# Patient Record
Sex: MALE | Race: WHITE | Employment: FULL TIME | URBAN - METROPOLITAN AREA
[De-identification: names, ages, dates, MRNs, and addresses within clinical notes are randomized per-mention and may not be internally consistent; named-entity substitution may affect disease eponyms.]

---

## 2017-04-19 ENCOUNTER — OFFICE VISIT (OUTPATIENT)
Dept: FAMILY MEDICINE CLINIC | Age: 52
End: 2017-04-19

## 2017-04-19 VITALS
RESPIRATION RATE: 18 BRPM | TEMPERATURE: 97.7 F | WEIGHT: 189 LBS | HEIGHT: 67 IN | HEART RATE: 60 BPM | DIASTOLIC BLOOD PRESSURE: 82 MMHG | BODY MASS INDEX: 29.66 KG/M2 | SYSTOLIC BLOOD PRESSURE: 118 MMHG

## 2017-04-19 DIAGNOSIS — K40.90 LEFT INGUINAL HERNIA: Primary | ICD-10-CM

## 2017-04-19 DIAGNOSIS — Z00.00 ROUTINE GENERAL MEDICAL EXAMINATION AT A HEALTH CARE FACILITY: ICD-10-CM

## 2017-04-19 DIAGNOSIS — B18.2 HEP C W/O COMA, CHRONIC (HCC): ICD-10-CM

## 2017-04-19 DIAGNOSIS — Z12.5 SCREENING PSA (PROSTATE SPECIFIC ANTIGEN): ICD-10-CM

## 2017-04-19 DIAGNOSIS — F41.9 ANXIETY: ICD-10-CM

## 2017-04-19 DIAGNOSIS — M54.5 LOW BACK PAIN, UNSPECIFIED BACK PAIN LATERALITY, UNSPECIFIED CHRONICITY, WITH SCIATICA PRESENCE UNSPECIFIED: ICD-10-CM

## 2017-04-19 DIAGNOSIS — D17.9 LIPOMA, UNSPECIFIED SITE: ICD-10-CM

## 2017-04-19 PROCEDURE — 99204 OFFICE O/P NEW MOD 45 MIN: CPT | Performed by: FAMILY MEDICINE

## 2017-04-19 RX ORDER — ESCITALOPRAM OXALATE 10 MG/1
10 TABLET ORAL DAILY
Qty: 30 TABLET | Refills: 3 | Status: SHIPPED | OUTPATIENT
Start: 2017-04-19 | End: 2017-06-07 | Stop reason: ALTCHOICE

## 2017-04-19 ASSESSMENT — ENCOUNTER SYMPTOMS
BACK PAIN: 1
SORE THROAT: 0
NAUSEA: 0
VOMITING: 0
RHINORRHEA: 0
SHORTNESS OF BREATH: 0
ABDOMINAL PAIN: 0
WHEEZING: 0
CONSTIPATION: 0
COUGH: 0
DIARRHEA: 0

## 2017-05-24 LAB
A/G RATIO: 1.5 (ref 1.5–2.5)
ABSOLUTE BASO #: 100 /CMM (ref 0–200)
ABSOLUTE EOS #: 100 /CMM (ref 0–500)
ABSOLUTE LYMPH #: 1300 /CMM (ref 1000–4800)
ABSOLUTE MONO #: 600 /CMM (ref 0–800)
ABSOLUTE NEUT #: 8700 /CMM (ref 1800–7700)
ALBUMIN SERPL-MCNC: 4.8 GM/DL (ref 3.5–5)
ALP BLD-CCNC: 74 IU/L (ref 41–137)
ALT SERPL-CCNC: 43 IU/L (ref 10–40)
ANION GAP SERPL CALCULATED.3IONS-SCNC: 6 MMOL/L (ref 4–12)
AST SERPL-CCNC: 39 IU/L (ref 15–41)
BASOPHILS RELATIVE PERCENT: 0.6 % (ref 0–2)
BILIRUB SERPL-MCNC: 1.2 MG/DL (ref 0.2–1)
BUN BLDV-MCNC: 12 MG/DL (ref 7–20)
CALCIUM SERPL-MCNC: 9.7 MG/DL (ref 8.8–10.5)
CHLORIDE BLD-SCNC: 103 MEQ/L (ref 101–111)
CHOLESTEROL/HDL RELATIVE RISK: 4.4 (ref 4–5)
CHOLESTEROL: 188 MG/DL
CO2: 28 MEQ/L (ref 21–32)
CREAT SERPL-MCNC: 0.98 MG/DL (ref 0.7–1.3)
CREATININE CLEARANCE: >60
DIRECT-LDL / HDL RISK: 3.9
EOSINOPHILS RELATIVE PERCENT: 0.8 % (ref 0–6)
GLUCOSE: 117 MG/DL (ref 70–110)
HCT VFR BLD CALC: 48.7 % (ref 40–49)
HDLC SERPL-MCNC: 42 MG/DL
HEMOGLOBIN: 16.3 GM/DL (ref 13.5–16.5)
LDL CHOLESTEROL DIRECT: 165 MG/DL
LYMPHOCYTES RELATIVE PERCENT: 11.7 % (ref 15–45)
MCH RBC QN AUTO: 29.2 PG (ref 27.5–33)
MCHC RBC AUTO-ENTMCNC: 33.4 GM/DL (ref 33–36)
MCV RBC AUTO: 87.3 CU MIC (ref 80–97)
MONOCYTES RELATIVE PERCENT: 5.9 % (ref 2–10)
NEUTROPHILS RELATIVE PERCENT: 81 % (ref 40–70)
NUCLEATED RBCS: 0.1 /100 WBC
PDW BLD-RTO: 14.1 % (ref 12–16)
PLATELET # BLD: 277 TH/CMM (ref 150–400)
POTASSIUM SERPL-SCNC: 4.2 MEQ/L (ref 3.6–5)
PROSTATE SPECIFIC ANTIGEN: 0.37 NG/ML
RBC # BLD: 5.58 MIL/CMM (ref 4.5–6)
SODIUM BLD-SCNC: 137 MEQ/L (ref 135–145)
TOTAL PROTEIN: 7.9 G/DL (ref 6.2–8)
TRIGL SERPL-MCNC: 52 MG/DL
VLDLC SERPL CALC-MCNC: 10 MG/DL
WBC # BLD: 10.8 TH/CMM (ref 4.4–10.5)

## 2017-05-25 ENCOUNTER — TELEPHONE (OUTPATIENT)
Dept: FAMILY MEDICINE CLINIC | Age: 52
End: 2017-05-25

## 2017-05-25 DIAGNOSIS — E78.00 ELEVATED CHOLESTEROL: Primary | ICD-10-CM

## 2017-05-25 LAB — Lab: ABNORMAL

## 2017-05-26 ENCOUNTER — TELEPHONE (OUTPATIENT)
Dept: FAMILY MEDICINE CLINIC | Age: 52
End: 2017-05-26

## 2017-05-26 DIAGNOSIS — R76.8 POSITIVE HEPATITIS C ANTIBODY TEST: Primary | ICD-10-CM

## 2017-06-02 ENCOUNTER — TELEPHONE (OUTPATIENT)
Dept: FAMILY MEDICINE CLINIC | Age: 52
End: 2017-06-02

## 2017-06-02 DIAGNOSIS — B18.2 HEP C W/O COMA, CHRONIC (HCC): Primary | ICD-10-CM

## 2017-06-07 ENCOUNTER — OFFICE VISIT (OUTPATIENT)
Age: 52
End: 2017-06-07

## 2017-06-07 VITALS
RESPIRATION RATE: 16 BRPM | TEMPERATURE: 97.2 F | HEART RATE: 74 BPM | WEIGHT: 180 LBS | SYSTOLIC BLOOD PRESSURE: 120 MMHG | BODY MASS INDEX: 27.28 KG/M2 | OXYGEN SATURATION: 99 % | HEIGHT: 68 IN | DIASTOLIC BLOOD PRESSURE: 74 MMHG

## 2017-06-07 DIAGNOSIS — K42.9 UMBILICAL HERNIA WITHOUT OBSTRUCTION AND WITHOUT GANGRENE: ICD-10-CM

## 2017-06-07 DIAGNOSIS — K40.20 BILATERAL INGUINAL HERNIA WITHOUT OBSTRUCTION OR GANGRENE, RECURRENCE NOT SPECIFIED: Primary | ICD-10-CM

## 2017-06-07 PROCEDURE — 99244 OFF/OP CNSLTJ NEW/EST MOD 40: CPT | Performed by: SURGERY

## 2017-06-07 ASSESSMENT — ENCOUNTER SYMPTOMS
EYE PAIN: 0
FACIAL SWELLING: 0
COLOR CHANGE: 0
SINUS PRESSURE: 0
VOICE CHANGE: 0
RECTAL PAIN: 0
PHOTOPHOBIA: 0
RHINORRHEA: 0
EYE DISCHARGE: 0
BACK PAIN: 0
APNEA: 0
CHOKING: 0
ABDOMINAL DISTENTION: 0
ANAL BLEEDING: 0
BLOOD IN STOOL: 0
WHEEZING: 0
EYE ITCHING: 0
ABDOMINAL PAIN: 0
CONSTIPATION: 0
TROUBLE SWALLOWING: 0
EYE REDNESS: 0
SHORTNESS OF BREATH: 0
STRIDOR: 0
NAUSEA: 0
VOMITING: 0
CHEST TIGHTNESS: 0
DIARRHEA: 0
SORE THROAT: 0
COUGH: 0

## 2017-06-21 ENCOUNTER — TELEPHONE (OUTPATIENT)
Dept: FAMILY MEDICINE CLINIC | Age: 52
End: 2017-06-21

## 2017-06-21 DIAGNOSIS — B18.2 HEP C W/O COMA, CHRONIC (HCC): Primary | ICD-10-CM

## 2017-07-13 ENCOUNTER — TELEPHONE (OUTPATIENT)
Age: 52
End: 2017-07-13

## 2018-03-24 ENCOUNTER — HOSPITAL ENCOUNTER (OUTPATIENT)
Dept: GENERAL RADIOLOGY | Age: 53
Discharge: HOME | End: 2018-03-24
Payer: MEDICAID

## 2018-03-24 ENCOUNTER — HOSPITAL ENCOUNTER (OUTPATIENT)
Age: 53
Discharge: HOME | End: 2018-03-24
Payer: MEDICAID

## 2018-03-24 DIAGNOSIS — Z51.81 ADMISSION FOR THERAPEUTIC DRUG MONITORING: ICD-10-CM

## 2018-03-24 LAB
EKG ATRIAL RATE: 74 BPM
EKG P AXIS: 73 DEGREES
EKG P-R INTERVAL: 166 MS
EKG Q-T INTERVAL: 372 MS
EKG QRS DURATION: 74 MS
EKG QTC CALCULATION (BAZETT): 412 MS
EKG R AXIS: 11 DEGREES
EKG T AXIS: 59 DEGREES
EKG VENTRICULAR RATE: 74 BPM

## 2018-03-24 PROCEDURE — 93005 ELECTROCARDIOGRAM TRACING: CPT | Performed by: NURSE PRACTITIONER

## 2018-03-24 PROCEDURE — 71046 X-RAY EXAM CHEST 2 VIEWS: CPT

## 2018-03-25 PROCEDURE — 93010 ELECTROCARDIOGRAM REPORT: CPT | Performed by: NUCLEAR MEDICINE

## 2018-12-17 ENCOUNTER — HOSPITAL ENCOUNTER (OUTPATIENT)
Age: 53
Setting detail: SPECIMEN
Discharge: HOME | End: 2018-12-17
Payer: COMMERCIAL

## 2018-12-17 LAB
ABSOLUTE EOS #: 0.2 K/UL (ref 0–0.44)
ABSOLUTE IMMATURE GRANULOCYTE: 0.13 K/UL (ref 0–0.3)
ABSOLUTE LYMPH #: 1.54 K/UL (ref 1.1–3.7)
ABSOLUTE MONO #: 0.81 K/UL (ref 0.1–1.2)
ALBUMIN SERPL-MCNC: 4.3 G/DL (ref 3.5–5.2)
ALBUMIN/GLOBULIN RATIO: 1.6 (ref 1–2.5)
ALP BLD-CCNC: 95 U/L (ref 40–129)
ALT SERPL-CCNC: 37 U/L (ref 5–41)
ANION GAP SERPL CALCULATED.3IONS-SCNC: 14 MMOL/L (ref 9–17)
AST SERPL-CCNC: 29 U/L
BASOPHILS # BLD: 1 % (ref 0–2)
BASOPHILS ABSOLUTE: 0.05 K/UL (ref 0–0.2)
BILIRUB SERPL-MCNC: 0.32 MG/DL (ref 0.3–1.2)
BUN BLDV-MCNC: 13 MG/DL (ref 6–20)
BUN/CREAT BLD: ABNORMAL (ref 9–20)
CALCIUM SERPL-MCNC: 9.2 MG/DL (ref 8.6–10.4)
CHLORIDE BLD-SCNC: 94 MMOL/L (ref 98–107)
CHOLESTEROL/HDL RATIO: 3.2
CHOLESTEROL: 173 MG/DL
CO2: 31 MMOL/L (ref 20–31)
CREAT SERPL-MCNC: 0.77 MG/DL (ref 0.7–1.2)
DIFFERENTIAL TYPE: ABNORMAL
EOSINOPHILS RELATIVE PERCENT: 2 % (ref 1–4)
GFR AFRICAN AMERICAN: >60 ML/MIN
GFR NON-AFRICAN AMERICAN: >60 ML/MIN
GFR SERPL CREATININE-BSD FRML MDRD: ABNORMAL ML/MIN/{1.73_M2}
GFR SERPL CREATININE-BSD FRML MDRD: ABNORMAL ML/MIN/{1.73_M2}
GLUCOSE BLD-MCNC: 182 MG/DL (ref 70–99)
HAV IGM SER IA-ACNC: NONREACTIVE
HCT VFR BLD CALC: 50 % (ref 40.7–50.3)
HDLC SERPL-MCNC: 54 MG/DL
HEMOGLOBIN: 15.4 G/DL (ref 13–17)
HEPATITIS B CORE IGM ANTIBODY: NONREACTIVE
HEPATITIS B SURFACE ANTIGEN: NONREACTIVE
HEPATITIS C ANTIBODY: REACTIVE
IMMATURE GRANULOCYTES: 1 %
INR BLD: 0.9
LDL CHOLESTEROL: 96 MG/DL (ref 0–130)
LYMPHOCYTES # BLD: 16 % (ref 24–43)
MCH RBC QN AUTO: 30 PG (ref 25.2–33.5)
MCHC RBC AUTO-ENTMCNC: 30.8 G/DL (ref 28.4–34.8)
MCV RBC AUTO: 97.3 FL (ref 82.6–102.9)
MONOCYTES # BLD: 8 % (ref 3–12)
NRBC AUTOMATED: 0 PER 100 WBC
PDW BLD-RTO: 13.2 % (ref 11.8–14.4)
PLATELET # BLD: 237 K/UL (ref 138–453)
PLATELET ESTIMATE: ABNORMAL
PMV BLD AUTO: 10.6 FL (ref 8.1–13.5)
POTASSIUM SERPL-SCNC: 4.1 MMOL/L (ref 3.7–5.3)
PROSTATE SPECIFIC ANTIGEN: 0.46 UG/L
PROTHROMBIN TIME: 9.9 SEC (ref 9–12)
RBC # BLD: 5.14 M/UL (ref 4.21–5.77)
RBC # BLD: ABNORMAL 10*6/UL
SEG NEUTROPHILS: 72 % (ref 36–65)
SEGMENTED NEUTROPHILS ABSOLUTE COUNT: 7.07 K/UL (ref 1.5–8.1)
SODIUM BLD-SCNC: 139 MMOL/L (ref 135–144)
T. PALLIDUM, IGG: NONREACTIVE
TOTAL PROTEIN: 7 G/DL (ref 6.4–8.3)
TRIGL SERPL-MCNC: 117 MG/DL
TSH SERPL DL<=0.05 MIU/L-ACNC: 1.52 MIU/L (ref 0.3–5)
VLDLC SERPL CALC-MCNC: NORMAL MG/DL (ref 1–30)
WBC # BLD: 9.8 K/UL (ref 3.5–11.3)
WBC # BLD: ABNORMAL 10*3/UL

## 2018-12-18 LAB
HIV AG/AB: NONREACTIVE
SEDIMENTATION RATE, ERYTHROCYTE: 4 MM (ref 0–10)

## 2018-12-19 ENCOUNTER — HOSPITAL ENCOUNTER (OUTPATIENT)
Dept: GENERAL RADIOLOGY | Age: 53
Discharge: HOME | End: 2018-12-19
Payer: COMMERCIAL

## 2018-12-19 ENCOUNTER — HOSPITAL ENCOUNTER (OUTPATIENT)
Age: 53
Discharge: HOME | End: 2018-12-19
Payer: COMMERCIAL

## 2018-12-19 DIAGNOSIS — F17.200 TOBACCO USE DISORDER: ICD-10-CM

## 2018-12-19 LAB
DIRECT EXAM: ABNORMAL
EKG ATRIAL RATE: 82 BPM
EKG P AXIS: 75 DEGREES
EKG P-R INTERVAL: 166 MS
EKG Q-T INTERVAL: 400 MS
EKG QRS DURATION: 76 MS
EKG QTC CALCULATION (BAZETT): 467 MS
EKG R AXIS: 10 DEGREES
EKG T AXIS: 53 DEGREES
EKG VENTRICULAR RATE: 82 BPM
Lab: ABNORMAL
SPECIMEN DESCRIPTION: ABNORMAL
STATUS: ABNORMAL

## 2018-12-19 PROCEDURE — 71046 X-RAY EXAM CHEST 2 VIEWS: CPT

## 2018-12-19 PROCEDURE — 93010 ELECTROCARDIOGRAM REPORT: CPT | Performed by: INTERNAL MEDICINE

## 2018-12-19 PROCEDURE — 93005 ELECTROCARDIOGRAM TRACING: CPT | Performed by: NURSE PRACTITIONER

## 2018-12-20 LAB
ALANINE AMINOTRANSFERASE, FIBROMETER: 44 U/L (ref 5–50)
ALPHA-2-MACROGLOBULIN, FIBROMETER: 236 MG/DL (ref 131–293)
ASPARTATE AMINOTRANSFERASE, FIBROMETER: 35 U/L (ref 9–50)
CIRRHOMETER PATIENT SCORE: 0.01
EER FIBROMETER REPORT: NORMAL
FIBROMETER INTERPRETATION: NORMAL
FIBROMETER PATIENT SCORE: 0.34
FIBROMETER PLATELET COUNT: 237
FIBROMETER PROTHROMBIN INDEX: 109 % (ref 90–120)
FIBROSIS METAVIR CLASSIFICATION: NORMAL
GAMMA GLUTAMYL TRANSFERASE, FIBROMETER: 28 U/L (ref 7–51)
INFLAMETER METAVIR CLASSIFICATION: NORMAL
INFLAMETER PATIENT SCORE: 0.27
UREA NITROGEN, FIBROMETER: 13 MG/DL (ref 7–20)

## 2018-12-23 LAB
HCV QUANTITATIVE: NORMAL
HEPATITIS C GENOTYPE: NORMAL

## 2019-01-11 ENCOUNTER — HOSPITAL ENCOUNTER (OUTPATIENT)
Age: 54
Setting detail: SPECIMEN
Discharge: HOME | End: 2019-01-11
Payer: COMMERCIAL

## 2019-01-11 LAB — URIC ACID: 5.7 MG/DL (ref 3.4–7)

## 2019-03-24 ENCOUNTER — HOSPITAL ENCOUNTER (EMERGENCY)
Age: 54
Discharge: HOME | End: 2019-03-24
Payer: COMMERCIAL

## 2019-03-24 VITALS
WEIGHT: 178 LBS | SYSTOLIC BLOOD PRESSURE: 155 MMHG | HEART RATE: 109 BPM | BODY MASS INDEX: 27.94 KG/M2 | DIASTOLIC BLOOD PRESSURE: 107 MMHG | HEIGHT: 67 IN | TEMPERATURE: 100.9 F | OXYGEN SATURATION: 98 % | RESPIRATION RATE: 18 BRPM

## 2019-03-24 DIAGNOSIS — K08.89 ODONTALGIA: Primary | ICD-10-CM

## 2019-03-24 DIAGNOSIS — R22.0 FACIAL SWELLING: ICD-10-CM

## 2019-03-24 DIAGNOSIS — K02.9 DENTAL CARIES: ICD-10-CM

## 2019-03-24 PROCEDURE — 99213 OFFICE O/P EST LOW 20 MIN: CPT | Performed by: NURSE PRACTITIONER

## 2019-03-24 PROCEDURE — 6360000002 HC RX W HCPCS: Performed by: NURSE PRACTITIONER

## 2019-03-24 PROCEDURE — 99212 OFFICE O/P EST SF 10 MIN: CPT

## 2019-03-24 PROCEDURE — 96372 THER/PROPH/DIAG INJ SC/IM: CPT

## 2019-03-24 RX ORDER — KETOROLAC TROMETHAMINE 30 MG/ML
60 INJECTION, SOLUTION INTRAMUSCULAR; INTRAVENOUS ONCE
Status: COMPLETED | OUTPATIENT
Start: 2019-03-24 | End: 2019-03-24

## 2019-03-24 RX ORDER — HYDROCHLOROTHIAZIDE 25 MG/1
25 TABLET ORAL DAILY
COMMUNITY
End: 2020-03-11

## 2019-03-24 RX ORDER — IBUPROFEN 800 MG/1
800 TABLET ORAL EVERY 8 HOURS PRN
Qty: 30 TABLET | Refills: 0 | Status: SHIPPED | OUTPATIENT
Start: 2019-03-24 | End: 2019-08-29 | Stop reason: HOSPADM

## 2019-03-24 RX ORDER — LAMOTRIGINE 100 MG/1
100 TABLET ORAL DAILY
COMMUNITY
End: 2020-03-11

## 2019-03-24 RX ORDER — TERAZOSIN 1 MG/1
1 CAPSULE ORAL NIGHTLY
COMMUNITY
End: 2020-03-11

## 2019-03-24 RX ORDER — GABAPENTIN 400 MG/1
400 CAPSULE ORAL 3 TIMES DAILY
COMMUNITY
End: 2020-03-11

## 2019-03-24 RX ORDER — AMOXICILLIN AND CLAVULANATE POTASSIUM 875; 125 MG/1; MG/1
1 TABLET, FILM COATED ORAL 2 TIMES DAILY
Qty: 20 TABLET | Refills: 0 | Status: SHIPPED | OUTPATIENT
Start: 2019-03-24 | End: 2019-04-03

## 2019-03-24 RX ADMIN — KETOROLAC TROMETHAMINE 60 MG: 30 INJECTION, SOLUTION INTRAMUSCULAR at 16:25

## 2019-03-24 ASSESSMENT — PAIN SCALES - GENERAL: PAINLEVEL_OUTOF10: 8

## 2019-03-24 ASSESSMENT — PAIN DESCRIPTION - ORIENTATION: ORIENTATION: LEFT;UPPER

## 2019-03-24 ASSESSMENT — PAIN DESCRIPTION - DESCRIPTORS: DESCRIPTORS: ACHING

## 2019-03-24 ASSESSMENT — PAIN DESCRIPTION - PAIN TYPE: TYPE: ACUTE PAIN

## 2019-03-24 ASSESSMENT — PAIN DESCRIPTION - FREQUENCY: FREQUENCY: CONTINUOUS

## 2019-08-29 ENCOUNTER — HOSPITAL ENCOUNTER (EMERGENCY)
Age: 54
Discharge: HOME | End: 2019-08-29
Payer: COMMERCIAL

## 2019-08-29 VITALS
BODY MASS INDEX: 25.76 KG/M2 | RESPIRATION RATE: 16 BRPM | WEIGHT: 170 LBS | HEIGHT: 68 IN | TEMPERATURE: 97.9 F | HEART RATE: 69 BPM | SYSTOLIC BLOOD PRESSURE: 175 MMHG | DIASTOLIC BLOOD PRESSURE: 96 MMHG | OXYGEN SATURATION: 100 %

## 2019-08-29 DIAGNOSIS — Z72.0 TOBACCO USE: ICD-10-CM

## 2019-08-29 DIAGNOSIS — K08.89 PAIN, DENTAL: Primary | ICD-10-CM

## 2019-08-29 DIAGNOSIS — K04.7 DENTAL INFECTION: ICD-10-CM

## 2019-08-29 PROCEDURE — 99212 OFFICE O/P EST SF 10 MIN: CPT

## 2019-08-29 PROCEDURE — 99213 OFFICE O/P EST LOW 20 MIN: CPT | Performed by: NURSE PRACTITIONER

## 2019-08-29 RX ORDER — NAPROXEN 500 MG/1
500 TABLET ORAL 2 TIMES DAILY WITH MEALS
Qty: 20 TABLET | Refills: 0 | Status: SHIPPED | OUTPATIENT
Start: 2019-08-29 | End: 2020-02-10

## 2019-08-29 RX ORDER — PENICILLIN V POTASSIUM 500 MG/1
500 TABLET ORAL 4 TIMES DAILY
Qty: 40 TABLET | Refills: 0 | Status: SHIPPED | OUTPATIENT
Start: 2019-08-29 | End: 2019-09-08

## 2019-08-29 ASSESSMENT — PAIN DESCRIPTION - DESCRIPTORS: DESCRIPTORS: STABBING

## 2019-08-29 ASSESSMENT — PAIN DESCRIPTION - LOCATION: LOCATION: TEETH

## 2019-08-29 ASSESSMENT — PAIN DESCRIPTION - FREQUENCY: FREQUENCY: CONTINUOUS

## 2019-08-29 ASSESSMENT — PAIN DESCRIPTION - ONSET: ONSET: PROGRESSIVE

## 2019-08-29 ASSESSMENT — PAIN DESCRIPTION - PAIN TYPE: TYPE: ACUTE PAIN

## 2019-08-29 ASSESSMENT — ENCOUNTER SYMPTOMS
COUGH: 0
FACIAL SWELLING: 1

## 2019-08-29 ASSESSMENT — PAIN SCALES - GENERAL: PAINLEVEL_OUTOF10: 10

## 2019-08-29 ASSESSMENT — PAIN DESCRIPTION - PROGRESSION: CLINICAL_PROGRESSION: RAPIDLY WORSENING

## 2019-08-29 ASSESSMENT — PAIN DESCRIPTION - ORIENTATION: ORIENTATION: LEFT;UPPER

## 2019-08-29 NOTE — ED PROVIDER NOTES
Patient'sfamily history includes Alcohol Abuse in his father and maternal grandfather; Allergy (Severe) in his mother; Anemia in his mother; Arrhythmia in his mother; Arthritis in his mother; Asthma in his mother; Breast Cancer in his paternal aunt; Cancer in his maternal aunt, maternal uncle, and paternal aunt; Coronary Art Dis in his mother; Depression in his mother; Diabetes in his mother; Early Death in his father; Heart Attack in his maternal aunt, maternal grandfather, maternal grandmother, maternal uncle, paternal aunt, paternal grandfather, paternal grandmother, and paternal uncle; Heart Disease in his maternal aunt, maternal grandfather, maternal grandmother, maternal uncle, paternal aunt, paternal grandfather, paternal grandmother, and paternal uncle; High Blood Pressure in his father, maternal aunt, maternal uncle, mother, paternal aunt, and paternal uncle; High Cholesterol in his father and mother; Learning Disabilities in his maternal aunt, maternal cousin, maternal uncle, paternal aunt, paternal cousin, and paternal uncle; Mental Illness in his maternal cousin; Mental Retardation in his maternal cousin; Dania Plenty / [place] in his mother; Substance Abuse in his paternal grandfather. SOCIAL HISTORY     Patient  reports that he has been smoking cigarettes. He has a 17.50 pack-year smoking history. He has never used smokeless tobacco. He reports that he has current or past drug history. Drug: Marijuana. Frequency: 2.00 times per week. He reports that he does not drink alcohol. PHYSICAL EXAM     ED TRIAGE VITALS  BP: (!) 175/96, Temp: 97.9 °F (36.6 °C), Pulse: 69, Resp: 16, SpO2: 100 %  Physical Exam   Constitutional: He is oriented to person, place, and time. He appears well-developed and well-nourished. He is cooperative. No distress. HENT:   Head: Normocephalic and atraumatic.    Right Ear: External ear normal.   Left Ear: External ear normal.   Mouth/Throat: Mucous membranes are normal.

## 2019-09-04 ENCOUNTER — TELEPHONE (OUTPATIENT)
Dept: FAMILY MEDICINE CLINIC | Age: 54
End: 2019-09-04

## 2020-02-10 ENCOUNTER — OFFICE VISIT (OUTPATIENT)
Dept: INTERNAL MEDICINE CLINIC | Age: 55
End: 2020-02-10

## 2020-02-10 VITALS
DIASTOLIC BLOOD PRESSURE: 94 MMHG | WEIGHT: 162 LBS | SYSTOLIC BLOOD PRESSURE: 170 MMHG | HEIGHT: 67 IN | HEART RATE: 76 BPM | BODY MASS INDEX: 25.43 KG/M2

## 2020-02-10 LAB
AMPHETAMINE SCREEN, URINE: ABNORMAL
BARBITURATE SCREEN, URINE: ABNORMAL
BENZODIAZEPINE SCREEN, URINE: ABNORMAL
BUPRENORPHINE URINE: ABNORMAL
COCAINE METABOLITE SCREEN URINE: ABNORMAL
GABAPENTIN SCREEN, URINE: ABNORMAL
MDMA URINE: ABNORMAL
METHADONE SCREEN, URINE: ABNORMAL
METHAMPHETAMINE, URINE: ABNORMAL
OPIATE SCREEN URINE: ABNORMAL
OXYCODONE SCREEN URINE: ABNORMAL
PHENCYCLIDINE SCREEN URINE: ABNORMAL
PROPOXYPHENE SCREEN, URINE: ABNORMAL
THC SCREEN, URINE: ABNORMAL
TRICYCLIC ANTIDEPRESSANTS, UR: ABNORMAL

## 2020-02-10 PROCEDURE — 80305 DRUG TEST PRSMV DIR OPT OBS: CPT | Performed by: INTERNAL MEDICINE

## 2020-02-10 PROCEDURE — 99204 OFFICE O/P NEW MOD 45 MIN: CPT | Performed by: INTERNAL MEDICINE

## 2020-02-10 RX ORDER — BUPRENORPHINE AND NALOXONE 8; 2 MG/1; MG/1
1 FILM, SOLUBLE BUCCAL; SUBLINGUAL 2 TIMES DAILY
Qty: 6 FILM | Refills: 0 | Status: SHIPPED | OUTPATIENT
Start: 2020-02-10 | End: 2020-02-13 | Stop reason: SDUPTHER

## 2020-02-10 RX ORDER — BUPRENORPHINE AND NALOXONE 8; 2 MG/1; MG/1
1 FILM, SOLUBLE BUCCAL; SUBLINGUAL 2 TIMES DAILY
COMMUNITY
End: 2020-02-10 | Stop reason: SDUPTHER

## 2020-02-10 NOTE — PROGRESS NOTES
MEDICATION ASSISTED TREATMENT ENCOUNTER    HISTORY OF PRESENT ILLNESS  Patient presents for evaluation of opioid use and would like to be placed on medication assisted treatment patient was getting Suboxone at [facility]  Patient was seen there for about a year  He wants to come here in order to get some counseling  He prefers not to go to [facility]  Patient has had problems using heroin  Patient started using heroin 20   years ago  Patient said he started using heroin in [facility]  He was in there at least 20 years  He got out 2-1/2 years ago the longest he has been out of skilled nursing since 1982  Patient uses it intravenously  Other drugs used: Meth and cocaine but not for him  Suboxone programs in the past he stayed clear at [facility] for at least a year    Vivitrol in the past he tried Vivitrol but he got erectile dysfunction  Last use of heroin at least a month  Patient would typically use was up to 3 g daily  He does not take fentanyl knowingly  He is overdosed a couple times in [facility]  He was on 16 mg daily at [facility]    Past Medical History:   Diagnosis Date    Allergic rhinitis     [facility]     Chronic back pain     Depression     GERD (gastroesophageal reflux disease)     Hypertension     Liver disease     Seizures ([facility]     Substance abuse ([facility]     TBI (traumatic brain injury) ([facility]     Tic        Past Surgical History:   Procedure Laterality Date    FACIAL COSMETIC SURGERY  12/2005    face reconstruction from heavy head trauma       PAST PSYCHIATRIC HISTORY: depression    No Known Allergies    Current Outpatient Medications   Medication Sig Dispense Refill    buprenorphine-naloxone (SUBOXONE) 8-2 MG FILM SL film Place 1 Film under the tongue 2 times daily for 7 days.  14 Film 0    hydrochlorothiazide (HYDRODIURIL) 25 MG tablet Take 25 mg by mouth daily      terazosin (HYTRIN) 1 MG capsule Take 1 mg by mouth nightly      gabapentin (NEURONTIN) 400 MG capsule Take 400 mg by mouth 3 times daily.  lamoTRIgine (LAMICTAL) 100 MG tablet Take 100 mg by mouth daily       No current facility-administered medications for this visit. SOCIAL     Marital status same girlfriend for 27 years     Children 4     Employment tomorrow he has an interview may start temporary at [employer] mom and girlfriend     Legal issues as above     Tobacco: yes, cigarettes     Alcohol no                 ROS     General: Patient denies fevers, chills ,weight changes, sweats     Psych: No depression, anxiety, suicidal ideation or attempts     Endocrine: No thyroid issues,no neck pain, no galactorrhea, no weight changes     Pulmonary: No shortness of breath, orthopnea, PND     Cardiac: No chest pain,syncope, no history of cardiac issues     GI: No trouble with bowels, no abdominal pain     : No dysuria, nocturia, urgency, frequency     MS: Patient denies bone or joint aches, no myalgias     Neuro: Patient denies headaches, seizures, tremors     Skin: No skin lesions, rashes    PHYSICAL EXAM    Blood pressure (!) 170/94, pulse 76, height 5' 7\" (1.702 m), weight 162 lb (73.5 kg).          General: Patient resting comfortably in no acute distress     Mental status: Alert and oriented to person place and time, no hallucinations or delusions     Eyes: Pupils are normal     Neck: Supple no JVD or bruits     Pulmonary: Good respiratory effort no wheezes rales or rhonchi     Cardiac: Normal S1 normal S2 no murmurs gallops or rubs     Abdomen nondistended nontender no organomegaly      Extremities: No cyanosis clubbing or edema     Neurologic: No focal neurologic deficit detected, no tremors     Musculoskeletal: No abnormal joint findings or muscle findings     Skin: No rashes, lesions or abnormalities noted        URINE DRUG SCREEN TODAY:  Recent Labs     02/13/20  1349   LABAMPH NEG   LABBARB NEG   LABBENZ NEG   BUPRENUR POS COCAIMETSCRU NEG   GABAPENTIN N/A   MDMA NEG   METAMPU NEG   LABMETH NEG   OPIATESCREENURINE NEG   OXTCOSU NEG   PHENCYCLIDINESCREENURINE NEG   PROPOXYPHENE N/A   THCSCREENUR POS   TRICYUR N/A           Diagnosis Orders   1. Severe opioid use disorder (HCC)  POCT Rapid Drug Screen    DISCONTINUED: buprenorphine-naloxone (SUBOXONE) 8-2 MG FILM SL film   2. Hep C w/o coma, chronic (HCC)     3. Encounter for monitoring Suboxone maintenance therapy           PLAN:  Provider provided education on Medication Assisted Treatment options, including: Suboxone, Sublocade, Methadone, and Naltrexone/Vivitrol  Allowed opportunity to respond to questions regarding the treatment options. Patient voices that their treatment preference is suboxone. I feel that this patient is an appropriate candidate for this treatment option. Education given on the importance of combining Medication Assisted Treatment with comprehensive treatment, including: individual counseling, treatment groups, community support groups, and psychiatry as applicable. Patient will meet with  to review clinic counseling expectations and to be linked to appropriate services. Provider reviewed medication contract with patient. Patient is agreeable to the program expectations. Both patient and provider signed the medication contract. Patient instructed to go to [address] [place] [address] to watch a video and learn about [facility]. I told patient [facility] is an opioid antagonist that reverses respiratory depression caused by opioids. Pharmacy will give patient or family member [facility] and explain how to use in an emergency.   Lab Results   Component Value Date    HEPCAB REACTIVE (A) 12/17/2018          No results found for: [id number]       No results found for: PREGTESTUR, PREGSERUM, HCG, HCGQUANT        I reviewed the [place] Automated Rx Reporting System report     There does not appear to be any discrepancies or overprescribing of controlled substances    I am to give the patient Suboxone 8 mg twice daily  We will have Rafael speak with him about counseling and meetings  Follow-up several days

## 2020-02-13 ENCOUNTER — OFFICE VISIT (OUTPATIENT)
Dept: INTERNAL MEDICINE CLINIC | Age: 55
End: 2020-02-13

## 2020-02-13 VITALS
HEIGHT: 67 IN | DIASTOLIC BLOOD PRESSURE: 87 MMHG | BODY MASS INDEX: 26.06 KG/M2 | SYSTOLIC BLOOD PRESSURE: 139 MMHG | WEIGHT: 166 LBS | HEART RATE: 65 BPM

## 2020-02-13 PROCEDURE — 99213 OFFICE O/P EST LOW 20 MIN: CPT | Performed by: INTERNAL MEDICINE

## 2020-02-13 PROCEDURE — 80305 DRUG TEST PRSMV DIR OPT OBS: CPT | Performed by: INTERNAL MEDICINE

## 2020-02-13 RX ORDER — BUPRENORPHINE AND NALOXONE 8; 2 MG/1; MG/1
1 FILM, SOLUBLE BUCCAL; SUBLINGUAL 2 TIMES DAILY
Qty: 14 FILM | Refills: 0 | Status: SHIPPED | OUTPATIENT
Start: 2020-02-13 | End: 2020-02-20 | Stop reason: SDUPTHER

## 2020-02-13 NOTE — PROGRESS NOTES
Verbal order per Dr. Shyann Gonzales for urine drug screen. Positive for BUP THC. Verified results with Helio Coleman RN. Dr. Shyann Gonzales ordered Suboxone 8mg film BID for patient. Verified dose with patient. Patient was sent home with 1 week script of Suboxone 8mg film BID and will be seen back in the office 2/20/2020.

## 2020-02-13 NOTE — PROGRESS NOTES
MEDICATION ASSISTED TREATMENT ENCOUNTER    HISTORY OF PRESENT ILLNESS  Patient presents for evaluation of opioid use and would like to be placed on medication assisted treatment  I saw him here for the first time 2/10     patient was getting Suboxone at [address] [place] [address]  Patient was seen there for about a year  He wants to come here in order to get some counseling  He prefers not to go to [facility]  Patient has had problems using heroin  Patient started using heroin 20   years ago  Patient said he [facility]  He was in there at least 20 years  He got out 2-1/2 years ago the longest he has been out of senior living since 1982  Patient uses it intravenously  Other drugs used: Meth and cocaine but not for him  Suboxone programs in the past he stayed clear at [address] [place] [address] for at least a year    Vivitrol in the past he tried Vivitrol but he got erectile dysfunction  Last use of heroin at least a month  Patient would typically use was up to 3 g daily  He does not take fentanyl knowingly  He is overdosed a couple times [place] [facility]  He was on 16 mg daily at [address] [place] [address]    Past Medical History:   Diagnosis Date    Allergic rhinitis     Bipolar disorder ([address]     Chronic back pain     Depression     GERD (gastroesophageal reflux disease)     Hypertension     Liver disease     Seizures ([address]     Substance abuse ([facility]     TBI (traumatic brain injury) ([facility]     Tic                     ROS     General:Patient is feeling well  Patient is not experiencing  withdrawal symptoms ,no urges or cravings  Patient is not having any side effects from the buprenorphine         PHYSICAL EXAM    Blood pressure 139/87, pulse 65, height 5' 7\" (1.702 m), weight 166 lb (75.3 kg).          General:    Musculoskeletal: No abnormal joint findings or muscle findings     Skin: No rashes, lesions or abnormalities noted        URINE DRUG SCREEN TODAY:  Recent Labs

## 2020-02-20 ENCOUNTER — OFFICE VISIT (OUTPATIENT)
Dept: INTERNAL MEDICINE CLINIC | Age: 55
End: 2020-02-20

## 2020-02-20 VITALS
HEIGHT: 67 IN | WEIGHT: 160 LBS | BODY MASS INDEX: 25.11 KG/M2 | DIASTOLIC BLOOD PRESSURE: 77 MMHG | SYSTOLIC BLOOD PRESSURE: 135 MMHG | HEART RATE: 80 BPM

## 2020-02-20 PROCEDURE — 80305 DRUG TEST PRSMV DIR OPT OBS: CPT | Performed by: NURSE PRACTITIONER

## 2020-02-20 PROCEDURE — 99213 OFFICE O/P EST LOW 20 MIN: CPT | Performed by: NURSE PRACTITIONER

## 2020-02-20 RX ORDER — BUPRENORPHINE AND NALOXONE 8; 2 MG/1; MG/1
1 FILM, SOLUBLE BUCCAL; SUBLINGUAL 2 TIMES DAILY
Qty: 14 FILM | Refills: 0 | Status: SHIPPED | OUTPATIENT
Start: 2020-02-20 | End: 2020-02-27 | Stop reason: SDUPTHER

## 2020-02-20 RX ORDER — AZITHROMYCIN 250 MG/1
250 TABLET, FILM COATED ORAL SEE ADMIN INSTRUCTIONS
Qty: 6 TABLET | Refills: 0 | Status: SHIPPED | OUTPATIENT
Start: 2020-02-20 | End: 2020-02-25

## 2020-02-20 ASSESSMENT — ENCOUNTER SYMPTOMS
ABDOMINAL DISTENTION: 0
DIARRHEA: 0
VOMITING: 0
SHORTNESS OF BREATH: 0
NAUSEA: 0
COUGH: 0

## 2020-02-20 NOTE — PATIENT INSTRUCTIONS
Can use Mucinex DM for cough and to thin secretions if needed  Plenty of fluids, rest.   Tylenol and/or Ibuprofen for fever/body aches  Salt water gargles for sore throat  Saline nasal spray 2 sprays each nostril 2-3 times daily  Zpack as directed

## 2020-02-20 NOTE — PROGRESS NOTES
[address] [facility] [address] [facility]  [address]  Dept: [phone]  Dept Fax: [phone]  Loc: [phone]     Visit Date:  2/20/2020    Patient:  Mindi Marquez  YOB: 1965    HPI:     Chief Complaint   Patient presents with    Drug Problem       Last seen 2/13/2020 by RAW  Started on Suboxone here 2/10. Previously with [facility] for history heroin use x 20 years. Was clean for a year. Incarcerated for 20 years, started there. Also has used Meth/Brenda in the past.   Last use heroin  History up to 3 g daily  [facility], had ED. Working at [employer]. Counseling - not seeing any    On Suboxone 8 mg BID. Denies cravings/urges, withdrawal.   UDS bup/THC    BP today 135/77. Asks why Dr. Isidoro Thomas is not refilling his other medications  Advised he needs to establish PCP to get these taken care of. Has been sick, congestion, nasal discharge green, some fevers last few days. Has been worsening for one weeks time. Not SOB, no chest pain, no sinus pain. Controlled Substance Monitoring:    Acute and Chronic Pain Monitoring:   RX Monitoring 2/20/2020   Periodic Controlled Substance Monitoring Possible medication side effects, risk of tolerance/dependence & alternative treatments discussed. ;No signs of potential drug abuse or diversion identified. ;Assessed functional status. ;Random urine drug screen sent today. Medications    Current Outpatient Medications:     buprenorphine-naloxone (SUBOXONE) 8-2 MG FILM SL film, Place 1 Film under the tongue 2 times daily for 7 days. , Disp: 14 Film, Rfl: 0    azithromycin (ZITHROMAX) 250 MG tablet, Take 1 tablet by mouth See Admin Instructions for 5 days 500mg on day 1 followed by 250mg on days 2 - 5, Disp: 6 tablet, Rfl: 0    hydrochlorothiazide (HYDRODIURIL) 25 MG tablet, Take 25 mg by mouth daily, Disp: , Rfl:     terazosin (HYTRIN) 1 MG capsule, Take 1 mg by mouth nightly, Disp: , Rfl:     gabapentin (NEURONTIN) 400 MG capsule, Take 400 mg by mouth 3 times daily. , Disp: , Rfl:     lamoTRIgine (LAMICTAL) 100 MG tablet, Take 100 mg by mouth daily, Disp: , Rfl:     The patient has No Known Allergies. Past Medical History  Bryanna Hall  has a past medical history of Allergic rhinitis, Bipolar disorder ([facility] Chronic back pain, Depression, GERD (gastroesophageal reflux disease), Hypertension, Liver disease, Seizures ([facility] Substance abuse ([facility] TBI (traumatic brain injury) ([facility] and Tic. Past Surgical History  The patient  has a past surgical history that includes Facial cosmetic surgery (12/2005). Family History  This patient's family history includes Alcohol Abuse in his father and maternal grandfather; Allergy (Severe) in his mother; Anemia in his mother; Arrhythmia in his mother; Arthritis in his mother; Asthma in his mother; Breast Cancer in his paternal aunt; Cancer in his maternal aunt, maternal uncle, and paternal aunt; Coronary Art Dis in his mother; Depression in his mother; Diabetes in his mother; Early Death in his father; Heart Attack in his maternal aunt, maternal grandfather, maternal grandmother, maternal uncle, paternal aunt, paternal grandfather, paternal grandmother, and paternal uncle; Heart Disease in his maternal aunt, maternal grandfather, maternal grandmother, maternal uncle, paternal aunt, paternal grandfather, paternal grandmother, and paternal uncle; High Blood Pressure in his father, maternal aunt, maternal uncle, mother, paternal aunt, and paternal uncle; High Cholesterol in his father and mother; Learning Disabilities in his maternal aunt, maternal cousin, maternal uncle, paternal aunt, paternal cousin, and paternal uncle; Mental Illness in his maternal cousin; Mental Retardation in his maternal cousin; Amber Gemma / [place] in his mother; Substance Abuse in his paternal grandfather.     Social History  Lorena Sandhu  reports that he has been smoking cigarettes. He started smoking about 42 years ago. He has a 17.50 pack-year smoking history. He has never used smokeless tobacco. He reports current alcohol use. He reports current drug use. Frequency: 2.00 times per week. Drug: Marijuana. Health Maintenance:    Health Maintenance   Topic Date Due    Hepatitis A vaccine (1 of 2 - Risk 2-dose series) 06/06/1967    Pneumococcal 0-64 years Vaccine (1 of 1 - PPSV23) 06/06/1972    DTaP/Tdap/Td vaccine (1 - Tdap) 06/06/1977    Hepatitis B vaccine (1 of 3 - Risk 3-dose series) 06/06/1985    Diabetes screen  06/06/2006    Shingles Vaccine (1 of 2) 06/06/2016    Colon cancer screen colonoscopy  06/06/2016    Flu vaccine (1) 09/01/2019    Potassium monitoring  12/17/2019    Creatinine monitoring  12/17/2019    Lipid screen  12/17/2023    HIV screen  Completed    Hib vaccine  Aged Out    Meningococcal (ACWY) vaccine  Aged Out       Subjective:      Review of Systems   Constitutional: Positive for fever. Negative for chills and fatigue. HENT: Positive for congestion (Week history, coughing up green). Eyes: Negative for visual disturbance. Respiratory: Negative for cough and shortness of breath. Cardiovascular: Negative for chest pain and leg swelling. Gastrointestinal: Negative for abdominal distention, diarrhea, nausea and vomiting. Genitourinary: Negative for difficulty urinating and dysuria. Musculoskeletal: Negative for arthralgias. Skin: Negative for rash and wound. Neurological: Negative for dizziness, weakness, light-headedness and headaches. Psychiatric/Behavioral: Negative for dysphoric mood. The patient is not nervous/anxious. Objective:     /77 (Site: Right Upper Arm, Position: Sitting, Cuff Size: Large Adult)   Pulse 80   Ht 5' 7\" (1.702 m)   Wt 160 lb (72.6 kg)   BMI 25.06 kg/m²     Physical Exam  Constitutional:       General: He is not in acute distress. Unknown NEG   Benzodiazepine Screen, Urine Unknown NEG   Buprenorphine Urine Unknown POS   Methadone Screen, Urine Unknown NEG   Methamphetamine, Urine Unknown NEG   Opiate Scrn, Ur Unknown NEG   Oxycodone Screen, Ur Unknown NEG   PCP Screen, Urine Unknown NEG   Propoxyphene Screen, Urine Unknown N/A   Tricyclic Antidepressants, Urine Unknown N/A   Cocaine Metabolite Screen, Urine Unknown NEG   MDMA, Urine Unknown NEG   Gabapentin Screen, Urine Unknown N/A   THC Screen, Urine Unknown POS     Assessment/Plan      1. Severe opioid use disorder (HCC)  UDS bup/THC only  OARRS appropriate to treatment  Stable on Suboxone 8 mg BID  Continue. Set up counseling, seek NA meetings. Return in one week for recheck. - POCT Rapid Drug Screen  - buprenorphine-naloxone (SUBOXONE) 8-2 MG FILM SL film; Place 1 Film under the tongue 2 times daily for 7 days. Dispense: 14 Film; Refill: 0    2. Encounter for monitoring Suboxone maintenance therapy    3. Hep C w/o coma, chronic (HCC)  Will need GI consult. 4. Acute non-recurrent maxillary sinusitis  Can use Mucinex DM for cough and to thin secretions if needed  Plenty of fluids, rest.   Tylenol and/or Ibuprofen for fever/body aches  Salt water gargles for sore throat  Saline nasal spray 2 sprays each nostril 2-3 times daily  Zpack as directed. Call if no improvement  - azithromycin (ZITHROMAX) 250 MG tablet; Take 1 tablet by mouth See Admin Instructions for 5 days 500mg on day 1 followed by 250mg on days 2 - 5  Dispense: 6 tablet; Refill: 0      Return in about 1 week (around 2/27/2020). Patient given educational materials - see patient instructions. Discussed use, benefit, and side effects of prescribed medications. All patient questions answered. Pt voiced understanding. Reviewed health maintenance.        Electronically signed SERINA Gutiérrez - CNP on 2/20/20 at 8:33 AM

## 2020-02-27 ENCOUNTER — OFFICE VISIT (OUTPATIENT)
Dept: INTERNAL MEDICINE CLINIC | Age: 55
End: 2020-02-27
Payer: MEDICAID

## 2020-02-27 VITALS
SYSTOLIC BLOOD PRESSURE: 139 MMHG | WEIGHT: 162 LBS | HEIGHT: 67 IN | HEART RATE: 99 BPM | BODY MASS INDEX: 25.43 KG/M2 | DIASTOLIC BLOOD PRESSURE: 67 MMHG

## 2020-02-27 PROCEDURE — 99213 OFFICE O/P EST LOW 20 MIN: CPT | Performed by: NURSE PRACTITIONER

## 2020-02-27 PROCEDURE — 80305 DRUG TEST PRSMV DIR OPT OBS: CPT | Performed by: NURSE PRACTITIONER

## 2020-02-27 RX ORDER — BUPRENORPHINE AND NALOXONE 8; 2 MG/1; MG/1
1 FILM, SOLUBLE BUCCAL; SUBLINGUAL 2 TIMES DAILY
Qty: 14 FILM | Refills: 0 | Status: SHIPPED | OUTPATIENT
Start: 2020-02-27 | End: 2020-03-05 | Stop reason: SDUPTHER

## 2020-02-27 ASSESSMENT — ENCOUNTER SYMPTOMS
DIARRHEA: 0
NAUSEA: 0
ABDOMINAL DISTENTION: 0
SHORTNESS OF BREATH: 0
VOMITING: 0
COUGH: 0

## 2020-02-27 NOTE — PROGRESS NOTES
[address] [facility]  [address]  Dept: [phone]  Dept Fax: [phone]: [phone]     Visit Date:  2/27/2020    Patient:  Emmett Ferguson  YOB: 1965    HPI:     Chief Complaint   Patient presents with    Drug Problem       HPI    I last saw him 2/20/2020. Sees Dr. Ab Hein for MAT. On Suboxone 8 mg BID. Denies cravings/urges, withdrawal.   UDS bup/THC  Working at [employer]    Sinusitis is clearing up after z-pack        Controlled Substance Monitoring:    Acute and Chronic Pain Monitoring:   RX Monitoring 2/27/2020   Periodic Controlled Substance Monitoring Possible medication side effects, risk of tolerance/dependence & alternative treatments discussed. ;No signs of potential drug abuse or diversion identified. ;Assessed functional status. ;Random urine drug screen sent today. Medications    Current Outpatient Medications:     buprenorphine-naloxone (SUBOXONE) 8-2 MG FILM SL film, Place 1 Film under the tongue 2 times daily for 7 days. , Disp: 14 Film, Rfl: 0    hydrochlorothiazide (HYDRODIURIL) 25 MG tablet, Take 25 mg by mouth daily, Disp: , Rfl:     terazosin (HYTRIN) 1 MG capsule, Take 1 mg by mouth nightly, Disp: , Rfl:     gabapentin (NEURONTIN) 400 MG capsule, Take 400 mg by mouth 3 times daily. , Disp: , Rfl:     lamoTRIgine (LAMICTAL) 100 MG tablet, Take 100 mg by mouth daily, Disp: , Rfl:     The patient has No Known Allergies. Past Medical History  Jennifer Chowdhury  has a past medical history of Allergic rhinitis, Bipolar disorder ([address] Chronic back pain, Depression, GERD (gastroesophageal reflux disease), Hypertension, Liver disease, Seizures ([address] Substance abuse ([address] TBI (traumatic brain injury) ([address] and Tic. Past Surgical History  The patient  has a past surgical history that includes Facial cosmetic surgery (12/2005).     Family History  This patient's family history

## 2020-03-05 ENCOUNTER — OFFICE VISIT (OUTPATIENT)
Dept: INTERNAL MEDICINE CLINIC | Age: 55
End: 2020-03-05
Payer: MEDICAID

## 2020-03-05 VITALS
DIASTOLIC BLOOD PRESSURE: 75 MMHG | SYSTOLIC BLOOD PRESSURE: 127 MMHG | BODY MASS INDEX: 25.74 KG/M2 | HEART RATE: 88 BPM | WEIGHT: 164 LBS | HEIGHT: 67 IN

## 2020-03-05 PROCEDURE — 99213 OFFICE O/P EST LOW 20 MIN: CPT | Performed by: INTERNAL MEDICINE

## 2020-03-05 PROCEDURE — G8484 FLU IMMUNIZE NO ADMIN: HCPCS | Performed by: INTERNAL MEDICINE

## 2020-03-05 PROCEDURE — G8427 DOCREV CUR MEDS BY ELIG CLIN: HCPCS | Performed by: INTERNAL MEDICINE

## 2020-03-05 PROCEDURE — 3017F COLORECTAL CA SCREEN DOC REV: CPT | Performed by: INTERNAL MEDICINE

## 2020-03-05 PROCEDURE — 80305 DRUG TEST PRSMV DIR OPT OBS: CPT | Performed by: INTERNAL MEDICINE

## 2020-03-05 PROCEDURE — 4004F PT TOBACCO SCREEN RCVD TLK: CPT | Performed by: INTERNAL MEDICINE

## 2020-03-05 PROCEDURE — G8419 CALC BMI OUT NRM PARAM NOF/U: HCPCS | Performed by: INTERNAL MEDICINE

## 2020-03-05 RX ORDER — GABAPENTIN 400 MG/1
400 CAPSULE ORAL 3 TIMES DAILY
Qty: 90 CAPSULE | Refills: 0 | Status: SHIPPED | OUTPATIENT
Start: 2020-03-05 | End: 2020-04-16 | Stop reason: SDUPTHER

## 2020-03-05 RX ORDER — BUPRENORPHINE AND NALOXONE 8; 2 MG/1; MG/1
1 FILM, SOLUBLE BUCCAL; SUBLINGUAL 2 TIMES DAILY
Qty: 14 FILM | Refills: 0 | Status: SHIPPED | OUTPATIENT
Start: 2020-03-05 | End: 2020-03-12 | Stop reason: SDUPTHER

## 2020-03-05 NOTE — PROGRESS NOTES
MEDICATION ASSISTED TREATMENT ENCOUNTER    HISTORY OF PRESENT ILLNESS  Patient presents for evaluation of opioid use and would like to be placed on medication assisted treatment  I saw him here for the first time 2/13  He saw Aidan Cabrera 2/27 while I was out   patient was getting Suboxone at [address] [place] [address]  Patient was seen there for about a year  He wants to come here in order to get some counseling  He prefers not to go to [facility]  Patient has had problems using heroin  Patient started using heroin 20   years ago  Patient said he [facility] using heroin in detention  He was in there at least 20 years  He got out 2-1/2 years ago the longest he has been out of [facility] since 1982  Patient uses it intravenously  Other drugs used: Meth and cocaine but not for him  Suboxone programs in the past he stayed clear at [address] [place] [address] for at least a year    Vivitrol in the past he tried Vivitrol but he got erectile dysfunction  Last use of heroin at least a month  Patient would typically use was up to 3 g daily  He does not take fentanyl knowingly  He is overdosed a couple times in detention  He was on 16 mg daily at [address] [place] [address]    Past Medical History:   Diagnosis Date    Allergic rhinitis     B[facility] disorder ([facility]     Chronic back pain     Depression     GERD (gastroesophageal reflux disease)     Hypertension     Liver disease     Seizures ([facility]     Substance abuse ([facility]     TBI (traumatic brain injury) ([facility]     Tic                     ROS     General:Patient is feeling well  Patient is not experiencing  withdrawal symptoms ,no urges or cravings  Patient is not having any side effects from the buprenorphine         PHYSICAL EXAM    Blood pressure 127/75, pulse 88, height 5' 7\" (1.702 m), weight 164 lb (74.4 kg).          General:    Musculoskeletal: No abnormal joint findings or muscle findings     Skin: No rashes, lesions or abnormalities

## 2020-03-11 ENCOUNTER — OFFICE VISIT (OUTPATIENT)
Dept: INTERNAL MEDICINE CLINIC | Age: 55
End: 2020-03-11
Payer: MEDICAID

## 2020-03-11 VITALS
HEART RATE: 72 BPM | HEIGHT: 67 IN | BODY MASS INDEX: 26.37 KG/M2 | WEIGHT: 168 LBS | SYSTOLIC BLOOD PRESSURE: 136 MMHG | DIASTOLIC BLOOD PRESSURE: 86 MMHG

## 2020-03-11 PROBLEM — F31.9 BIPOLAR DISORDER (HCC): Chronic | Status: ACTIVE | Noted: 2020-03-11

## 2020-03-11 PROCEDURE — 99204 OFFICE O/P NEW MOD 45 MIN: CPT | Performed by: NURSE PRACTITIONER

## 2020-03-11 PROCEDURE — 99204 OFFICE O/P NEW MOD 45 MIN: CPT | Performed by: PHYSICIAN ASSISTANT

## 2020-03-11 PROCEDURE — G8926 SPIRO NO PERF OR DOC: HCPCS | Performed by: NURSE PRACTITIONER

## 2020-03-11 PROCEDURE — G8427 DOCREV CUR MEDS BY ELIG CLIN: HCPCS | Performed by: NURSE PRACTITIONER

## 2020-03-11 PROCEDURE — 3023F SPIROM DOC REV: CPT | Performed by: NURSE PRACTITIONER

## 2020-03-11 PROCEDURE — G8484 FLU IMMUNIZE NO ADMIN: HCPCS | Performed by: NURSE PRACTITIONER

## 2020-03-11 PROCEDURE — 4004F PT TOBACCO SCREEN RCVD TLK: CPT | Performed by: NURSE PRACTITIONER

## 2020-03-11 PROCEDURE — 3017F COLORECTAL CA SCREEN DOC REV: CPT | Performed by: PHYSICIAN ASSISTANT

## 2020-03-11 PROCEDURE — G8419 CALC BMI OUT NRM PARAM NOF/U: HCPCS | Performed by: NURSE PRACTITIONER

## 2020-03-11 PROCEDURE — G8484 FLU IMMUNIZE NO ADMIN: HCPCS | Performed by: PHYSICIAN ASSISTANT

## 2020-03-11 PROCEDURE — G8427 DOCREV CUR MEDS BY ELIG CLIN: HCPCS | Performed by: PHYSICIAN ASSISTANT

## 2020-03-11 PROCEDURE — 4004F PT TOBACCO SCREEN RCVD TLK: CPT | Performed by: PHYSICIAN ASSISTANT

## 2020-03-11 PROCEDURE — G8419 CALC BMI OUT NRM PARAM NOF/U: HCPCS | Performed by: PHYSICIAN ASSISTANT

## 2020-03-11 PROCEDURE — 3017F COLORECTAL CA SCREEN DOC REV: CPT | Performed by: NURSE PRACTITIONER

## 2020-03-11 RX ORDER — ALBUTEROL SULFATE 90 UG/1
2 AEROSOL, METERED RESPIRATORY (INHALATION) 4 TIMES DAILY PRN
Qty: 1 INHALER | Refills: 5 | Status: SHIPPED | OUTPATIENT
Start: 2020-03-11 | End: 2020-12-02

## 2020-03-11 RX ORDER — LIDOCAINE 50 MG/G
1 PATCH TOPICAL DAILY
Qty: 30 PATCH | Refills: 0 | Status: CANCELLED | OUTPATIENT
Start: 2020-03-11

## 2020-03-11 RX ORDER — METHYLPREDNISOLONE 4 MG/1
TABLET ORAL
Qty: 1 KIT | Refills: 0 | Status: SHIPPED | OUTPATIENT
Start: 2020-03-11 | End: 2020-03-17

## 2020-03-11 RX ORDER — BUPROPION HYDROCHLORIDE 150 MG/1
150 TABLET ORAL EVERY MORNING
Qty: 30 TABLET | Refills: 1 | Status: SHIPPED | OUTPATIENT
Start: 2020-03-11 | End: 2020-03-11

## 2020-03-11 RX ORDER — BUPROPION HYDROCHLORIDE 150 MG/1
TABLET ORAL
Qty: 60 TABLET | Refills: 1 | Status: SHIPPED | OUTPATIENT
Start: 2020-03-11 | End: 2020-12-02

## 2020-03-11 ASSESSMENT — PATIENT HEALTH QUESTIONNAIRE - PHQ9
SUM OF ALL RESPONSES TO PHQ QUESTIONS 1-9: 0
SUM OF ALL RESPONSES TO PHQ QUESTIONS 1-9: 0
2. FEELING DOWN, DEPRESSED OR HOPELESS: 0
SUM OF ALL RESPONSES TO PHQ9 QUESTIONS 1 & 2: 0
1. LITTLE INTEREST OR PLEASURE IN DOING THINGS: 0

## 2020-03-11 NOTE — PROGRESS NOTES
Miscarriages / Stillbirths Mother     Alcohol Abuse Father     Early Death Father     High Blood Pressure Father     High Cholesterol Father     Cancer Maternal Aunt     Heart Attack Maternal Aunt     Heart Disease Maternal Aunt     High Blood Pressure Maternal Aunt     Learning Disabilities Maternal Aunt     Cancer Maternal Uncle     Heart Attack Maternal Uncle     Heart Disease Maternal Uncle     High Blood Pressure Maternal Uncle     Learning Disabilities Maternal Uncle     Breast Cancer Paternal Aunt     Cancer Paternal Aunt     Heart Attack Paternal Aunt     Heart Disease Paternal Aunt     High Blood Pressure Paternal Aunt     Learning Disabilities Paternal Aunt     Heart Attack Paternal Uncle     Heart Disease Paternal Uncle     High Blood Pressure Paternal Uncle     Learning Disabilities Paternal Uncle     Heart Attack Maternal Grandmother     Heart Disease Maternal Grandmother     Alcohol Abuse Maternal Grandfather     Heart Attack Maternal Grandfather     Heart Disease Maternal Grandfather     Heart Attack Paternal Grandmother     Heart Disease Paternal Grandmother     Heart Attack Paternal Grandfather     Heart Disease Paternal Grandfather     Substance Abuse Paternal Grandfather     Learning Disabilities Maternal Cousin     Mental Illness Maternal Cousin     Mental Retardation Maternal Cousin     Learning Disabilities Paternal Cousin          Mental Status Exam  Level of consciousness:  Within normal limits  Appearance: wearing street clothes, seated in chair  Behavior/Motor: no abnormalities noted  Attitude toward examiner:  Cooperative, attentive, good eye contact  Speech:  Spontaneous, normal rate and volume  Mood: Euthymic  Affect:  mood congruent  Thought processes:  linear, goal directed and coherent  Thought content:   Denies suicidal ideations   Denies  homicidal ideations               Denies  hallucinations   Denies delusions  Cognition:  In tact  Memory: age appropriate  Insight and judgement: fair  Medication side effects: Sexual dysfunction, possibly from Suboxone      DSM-5 DIAGNOSIS:    Bipolar disorder  rule out Major depressive disorder, recurrent, in partial remission     Panic attacks  Substance use disorder, opiate dependence, in remission, on partial agonist therapy      PLAN    1. Start Wellbutrin titration to help with depression and also potential sexual side effects from Suboxone. Continue Lamictal.  2.  Continue to establish with new medical provider, also in our office. Work-up may also reveal causative/contributing factors to #1  3. Follow-up 4 weeks, sooner as needed, call with questions    Electronically signed by Kameron Cheatham PA-C on 3/11/2020 at 10:46 AM Time Spent greater than 55 minutes from 10 40-11 45am was spent in evaluation, psychotherapy, and patient education and supportive work. I have discussed with the patient risks, benefits, side effects, and alternatives (and relevant risks, benefits, and side effects related to alternatives or not receiving care), and likelihood of the success. Patient instructed to seek emergency help via ED or calling 911 should symptoms become severe, or if thoughts to harm self or others develop. Patient stated clear understanding and is agreeable to treatment plan.

## 2020-03-11 NOTE — PROGRESS NOTES
ago that he has a \"lump\" on right side of neck. Denies dysphagia or choking. Lipomas- Has scattered throughout body, has had for years. States he had fit testing over a year ago.     Past Medical History:   Diagnosis Date    Allergic rhinitis     [facility]     Chronic back pain     COPD (chronic obstructive pulmonary disease) (HCC)     Depression     GERD (gastroesophageal reflux disease)     Hypertension     Liver disease     Se[facility]ures [facility]     Substance abuse ([facility]     TBI (traumatic brain injury) ([facility]     Tic         Past Surgical History:   Procedure Laterality Date    FACIAL COSMETIC SURGERY  12/2005    face reconstruction from heavy head trauma        Family History   Problem Relation Age of Onset    Allergy (Severe) Mother    [facility] Anemia Mother    [facility] Arthritis Mother     Arrhythmia Mother     Asthma Mother     Coronary Art Dis Mother     Depression Mother     Diabetes Mother     High Blood Pressure Mother     High Cholesterol Mother     Miscarriages / Stillbirths Mother     Alcohol Abuse Father     Early Death Father     High Blood Pressure Father     High Cholesterol Father     Cancer Maternal Aunt     Heart Attack Maternal Aunt     Heart Disease Maternal Aunt     High Blood Pressure Maternal Aunt     Learning Disabilities Maternal Aunt     Cancer Maternal Uncle     Heart Attack Maternal Uncle     Heart Disease Maternal Uncle     High Blood Pressure Maternal Uncle     Learning Disabilities Maternal Uncle     Breast Cancer Paternal Aunt     Cancer Paternal Aunt     Heart Attack Paternal Aunt     Heart Disease Paternal Aunt     High Blood Pressure Paternal [de-identified]     Learning Disabilities Paternal Aunt     Heart Attack Paternal Uncle     Heart Disease Paternal Uncle     High Blood Pressure Paternal Uncle     Learning Disabilities Paternal Uncle     Heart Attack Maternal Grandmother     Heart Disease Maternal Grandmother     Alcohol Abuse Maternal Grandfather     Heart Attack Maternal Grandfather     Heart Disease Maternal Grandfather     Heart Attack Paternal Grandmother     Heart Disease Paternal Grandmother     Heart Attack Paternal Grandfather     Heart Disease Paternal Grandfather     Substance Abuse Paternal Grandfather     Learning Disabilities Maternal Cousin     Mental Illness Maternal Cousin     Mental Retardation Maternal Cousin     Learning Disabilities Paternal Cousin         Social History     Socioeconomic History    Marital status: Single     Spouse name: Not on file    Number of children: Not on file    Years of education: Not on file    Highest education level: Not on file   Occupational History    Not on file   Social Needs    Financial resource strain: Not on file    Food insecurity     Worry: Not on file     Inability: Not on file   [employer] needs     Medical: Not on file     Non-medical: Not on file   Tobacco Use    Smoking status: Current Every Day Smoker     Packs/day: 0.50     Years: 35.00     Pack years: 17.50     Types: Cigarettes     Start date: 1/1/1978    Smokeless tobacco: Never Used   Substance and Sexual Activity    Alcohol use: Yes     Comment: 1 beer once a month    Drug use: Yes     Frequency: 2.0 times per week     Types: Marijuana     Comment: hx of heroine    Sexual activity: Yes     Partners: Female   Lifestyle    Physical activity     Days per week: Not on file     Minutes per session: Not on file    Stress: Not on file   Relationships    Social connections     Talks on phone: Not on file     Gets together: Not on file     Attends Hoahaoism service: Not on file     Active member of club or organization: Not on file     Attends meetings of clubs or organizations: Not on file     Relationship status: Not on file    Intimate partner violence     Fear of current or ex partner: Not on file     Emotionally abused: Not on file     Physically abused: Not on file     Forced sexual activity: Not on file   Other Topics Concern    Not on file   Social History Narrative    Not on file       Prior to Admission medications    Medication Sig Start Date End Date Taking? Authorizing Provider   albuterol sulfate HFA (VENTOLIN HFA) 108 (90 Base) MCG/ACT inhaler Inhale 2 puffs into the lungs 4 times daily as needed for Wheezing 3/11/20  Yes SERINA Freire CNP   methylPREDNISolone (MEDROL DOSEPACK) 4 MG tablet Take by mouth. 3/11/20 3/17/20 Yes SERINA Jain CNP   buprenorphine-naloxone (SUBOXONE) 8-2 MG FILM SL film Place 1 Film under the tongue 2 times daily for 7 days. 3/5/20 3/12/20 Yes Lise Snyder MD   gabapentin (NEURONTIN) 400 MG capsule Take 1 capsule by mouth 3 times daily for 30 days. 3/5/20 4/4/20 Yes Lise Snyder MD   buPROPion (WELLBUTRIN XL) 150 MG extended release tablet Take 1 tablet by mouth every morning for 3 days, then increase to 2 tablets every morning 3/11/20   Adele Mascorro PA-C        No Known Allergies    Review of Systems - General ROS: negative for - chills, fatigue, fever, hot flashes, weight gain or weight loss  Psychological ROS: positive for - anxiety  negative for - behavioral disorder, concentration difficulties, depression, disorientation or irritability  Hematological and Lymphatic ROS: No history of blood clots or bleeding disorder. Respiratory ROS: positive for - cough, shortness of breath and wheezing  Cardiovascular ROS: no chest pain or dyspnea on exertion  Gastrointestinal ROS: no abdominal pain, change in bowel habits, or black or bloody stools  Genito-Urinary ROS: positive for - hesitancy, weak stream, erectile dysfunction  negative for - dysuria or hematuria  Musculoskeletal ROS: positive for - gait disturbance, muscle pain and pain in lower back. Hand pain.   Neurological ROS: positive for - dizziness, numbness/tingling and seizures, has some difficulty with memory after his TBI  negative for - behavioral changes, speech Standing Status:   Future     Standing Expiration Date:   3/11/2021    XR LUMBAR SPINE (2-3 VIEWS)     Standing Status:   Future     Standing Expiration Date:   3/11/2021    US HEAD NECK SOFT TISSUE THYROID     Standing Status:   Future     Standing Expiration Date:   3/11/2021     Order Specific Question:   Reason for exam:     Answer:   Submandibular lymphadenopathy    Comprehensive Metabolic Panel, Fasting     Standing Status:   Future     Standing Expiration Date:   3/11/2021    CBC     Standing Status:   Future     Standing Expiration Date:   3/11/2021    Lipid Panel     Standing Status:   Future     Standing Expiration Date:   3/11/2021     Order Specific Question:   Is Patient Fasting?/# of Hours     Answer:   15    PSA Screening     Standing Status:   Future     Standing Expiration Date:   3/11/2021    Urinalysis With Microscopic     Standing Status:   Future     Standing Expiration Date:   3/11/2021     Order Specific Question:   SPECIFY(EX-CATH,MIDSTREAM,CYSTO,ETC)? Answer:   midstream    TSH With Reflex Ft4     Standing Status:   Future     Standing Expiration Date:   3/11/2021    HCV Ultra Quant (Viral Load)     Standing Status:   Future     Standing Expiration Date:   3/11/2021   [facility]. [facility] Neurology ([facility]) - Rutrosie Dominguez MD     Referral Priority:   Routine     Referral Type:   Eval and Treat     Referral Reason:   Specialty Services Required     Referred to Provider:   Charisma Sprague MD     Requested Specialty:   Neurology     Number of Visits Requested:   1    Full PFT Study With Bronchodilator     Standing Status:   Future     Standing Expiration Date:   3/11/2021        Will schedule follow up appointment in 1 months. Continue medications at current doses, with changes Medrol dose pack. Will discuss Derm referral next visit for scattered lipomas. Will also discuss FIT testing at this time.     Electronically signed by Glenda Severs, APRN - CNP 03/11/20 3:10 PM [facility], [facility]     Phone number: [phone]  Fax number: [phone]

## 2020-03-12 ENCOUNTER — OFFICE VISIT (OUTPATIENT)
Dept: INTERNAL MEDICINE CLINIC | Age: 55
End: 2020-03-12
Payer: MEDICAID

## 2020-03-12 VITALS
BODY MASS INDEX: 25.74 KG/M2 | WEIGHT: 164 LBS | SYSTOLIC BLOOD PRESSURE: 152 MMHG | HEIGHT: 67 IN | DIASTOLIC BLOOD PRESSURE: 87 MMHG | HEART RATE: 73 BPM

## 2020-03-12 PROCEDURE — G8419 CALC BMI OUT NRM PARAM NOF/U: HCPCS | Performed by: INTERNAL MEDICINE

## 2020-03-12 PROCEDURE — G8427 DOCREV CUR MEDS BY ELIG CLIN: HCPCS | Performed by: INTERNAL MEDICINE

## 2020-03-12 PROCEDURE — 99213 OFFICE O/P EST LOW 20 MIN: CPT | Performed by: INTERNAL MEDICINE

## 2020-03-12 PROCEDURE — 80305 DRUG TEST PRSMV DIR OPT OBS: CPT | Performed by: INTERNAL MEDICINE

## 2020-03-12 PROCEDURE — 4004F PT TOBACCO SCREEN RCVD TLK: CPT | Performed by: INTERNAL MEDICINE

## 2020-03-12 PROCEDURE — 3017F COLORECTAL CA SCREEN DOC REV: CPT | Performed by: INTERNAL MEDICINE

## 2020-03-12 PROCEDURE — G8484 FLU IMMUNIZE NO ADMIN: HCPCS | Performed by: INTERNAL MEDICINE

## 2020-03-12 RX ORDER — BUPRENORPHINE AND NALOXONE 8; 2 MG/1; MG/1
1 FILM, SOLUBLE BUCCAL; SUBLINGUAL 2 TIMES DAILY
Qty: 14 FILM | Refills: 0 | Status: SHIPPED | OUTPATIENT
Start: 2020-03-12 | End: 2020-03-19 | Stop reason: SDUPTHER

## 2020-03-12 NOTE — PROGRESS NOTES
MEDICATION ASSISTED TREATMENT ENCOUNTER    HISTORY OF PRESENT ILLNESS  Patient presents for evaluation of opioid use and would like to be placed on medication assisted treatment  I saw him here for the first time 3/5  He saw Ryanne Norman 3/12 while I was out   patient was getting Suboxone at [address] [place] [address]  Patient was seen there for about a year  He wants to come here in order to get some counseling  He prefers not to go to [facility]  Patient has had problems using heroin  Patient started using heroin 20   years ago  Patient said he started using heroin in assisted  He was in there at least 20 years  He got out 2-1/2 years ago the longest he has been out of prison since 1982  Patient uses it intravenously  Other drugs used: Meth and cocaine but not for him  Suboxone programs in the past he stayed clear at [address] [place] [address] for at least a year    Vivitrol in the past he tried Vivitrol but he got erectile dysfunction  Last use of heroin at least a month  Patient would typically use was up to 3 g daily  He does not take fentanyl knowingly  He is overdosed a couple times in assisted  He was on 16 mg daily at [address] [place] [address]    Past Medical History:   Diagnosis Date    Allergic rhinitis     Bipolar disorder ([address]     Chronic back pain     COPD (chronic obstructive pulmonary disease) ([facility]     Depression     GERD (gastroesophageal reflux disease)     Hypertension     Liver disease     Seizures ([address]     Substance abuse ([facility] [address]     TBI (traumatic brain injury) ([facility]     Tic                     ROS     General:Patient is feeling well  Patient is not experiencing  withdrawal symptoms ,no urges or cravings  Patient is not having any side effects from the buprenorphine         PHYSICAL EXAM    Blood pressure (!) 152/87, pulse 73, height 5' 7\" (1.702 m), weight 164 lb (74.4 kg).          General:    Musculoskeletal: No abnormal joint findings or muscle findings     Skin: No rashes, lesions or abnormalities noted        URINE DRUG SCREEN TODAY:  Recent Labs     03/12/20  1100   LABAMPH NEG   LABBARB NEG   LABBENZ NEG   BUPRENUR POS   COCAIMETSCRU NEG   GABAPENTIN N/A   MDMA NEG   METAMPU NEG   LABMETH NEG   OPIATESCREENURINE NEG   OXTCOSU NEG   PHENCYCLIDINESCREENURINE NEG   PROPOXYPHENE N/A   THCSCREENUR POS   TRICYUR N/A           Diagnosis Orders   1. Severe opioid use disorder (HCC)  POCT Rapid Drug Screen    buprenorphine-naloxone (SUBOXONE) 8-2 MG FILM SL film   2. Hep C w/o coma, chronic (HCC)     3. Bipolar affective disorder in remission ([facility]     4.  Polysubstance abuse ([facility]           PLAN:    I continued 16 mg daily Suboxone on the patient  He said he is doing well  He has no urges or cravings  Marciano Stapleton is going to help him set up meetings and counseling    I reviewed the [place] Automated [facility] Reporting System report     There does not appear to be any discrepancies or overprescribing of controlled substances  He does have hep C, apparently [facility]  is going to treat  I did refill his gabapentin  Follow-up 3/19

## 2020-03-12 NOTE — PROGRESS NOTES
Verbal order per Dr. Cassidy Friedman for urine drug screen. Positive for BUP, THC. Verified results with Erinn Chen LPN. Dr. Cassidy Friedman ordered Suboxone 8 mg film BID for patient. Verified dose with patient. Patient was sent home with 1 week script for Suboxone 8 mg film BID and will be seen back in the office on 3/19/2020.

## 2020-03-19 ENCOUNTER — TELEPHONE (OUTPATIENT)
Dept: INTERNAL MEDICINE CLINIC | Age: 55
End: 2020-03-19

## 2020-03-19 ENCOUNTER — OFFICE VISIT (OUTPATIENT)
Dept: INTERNAL MEDICINE CLINIC | Age: 55
End: 2020-03-19
Payer: MEDICAID

## 2020-03-19 VITALS
BODY MASS INDEX: 26.84 KG/M2 | DIASTOLIC BLOOD PRESSURE: 76 MMHG | TEMPERATURE: 98 F | SYSTOLIC BLOOD PRESSURE: 139 MMHG | HEART RATE: 78 BPM | HEIGHT: 67 IN | WEIGHT: 171 LBS

## 2020-03-19 PROCEDURE — 3017F COLORECTAL CA SCREEN DOC REV: CPT | Performed by: INTERNAL MEDICINE

## 2020-03-19 PROCEDURE — G8427 DOCREV CUR MEDS BY ELIG CLIN: HCPCS | Performed by: INTERNAL MEDICINE

## 2020-03-19 PROCEDURE — 80305 DRUG TEST PRSMV DIR OPT OBS: CPT | Performed by: INTERNAL MEDICINE

## 2020-03-19 PROCEDURE — 4004F PT TOBACCO SCREEN RCVD TLK: CPT | Performed by: INTERNAL MEDICINE

## 2020-03-19 PROCEDURE — G8484 FLU IMMUNIZE NO ADMIN: HCPCS | Performed by: INTERNAL MEDICINE

## 2020-03-19 PROCEDURE — G8419 CALC BMI OUT NRM PARAM NOF/U: HCPCS | Performed by: INTERNAL MEDICINE

## 2020-03-19 PROCEDURE — 99213 OFFICE O/P EST LOW 20 MIN: CPT | Performed by: INTERNAL MEDICINE

## 2020-03-19 RX ORDER — BUPRENORPHINE AND NALOXONE 8; 2 MG/1; MG/1
1 FILM, SOLUBLE BUCCAL; SUBLINGUAL 2 TIMES DAILY
Qty: 14 FILM | Refills: 0 | Status: SHIPPED | OUTPATIENT
Start: 2020-03-19 | End: 2020-03-26 | Stop reason: SDUPTHER

## 2020-03-19 NOTE — TELEPHONE ENCOUNTER
Could be from med changes. As long as he has no contraindications to taking NSAID's, could Try 2 tablets of Ibuprofen 200 mg along with 2 Tylenol 325 mg, rest in a dark room, and see if that helps. Can repeat the combo dosing every 4 hours as needed. May need to go in to be evaluated if this has a \"Worst headache of my life\" type of intensity, or be seen here if the addition of Ibuprofen is not assistive .

## 2020-03-19 NOTE — TELEPHONE ENCOUNTER
Did these just start today? What area of the head. Has he tried any thing for this? Is he having any congestion? Any other symptoms such as light headedness, dizziness, fever, cough, body aches. BP is fine and should not be causing headaches, need to look for other etiology.

## 2020-03-19 NOTE — TELEPHONE ENCOUNTER
It been going for 3 days and the pain is in the back of the head at the top and he has been taking 325mg of tylenol 2 capsules and that has not been helping, some light headedness and dizziness no fever,cough. Some body aches but he says it could from work do to him being on his feet for 10 hours.

## 2020-03-19 NOTE — TELEPHONE ENCOUNTER
Pt called stating he has been having headaches of and on today and  They will not go away he believes it maybe his blood pressure, he is not having and dizziness or chest pain. He was seen by  today and his /76 with a pulse 78

## 2020-03-19 NOTE — PROGRESS NOTES
MEDICATION ASSISTED TREATMENT ENCOUNTER    HISTORY OF PRESENT ILLNESS  Patient presents for evaluation of opioid use and would like to be placed on medication assisted treatment  I saw him here for the first time 3/5  He saw Sergio Goss 3/12 while I was out   patient was getting Suboxone at [address] [place] [address]  Patient was seen there for about a year  He wants to come here in order to get some counseling  He prefers not to go to [facility]  Patient has had problems using heroin  Patient started using heroin 20   years ago  Patient said he [facility] using heroin in halfway  He was in there at least 20 years  He got out 2-1/2 years ago the longest he has been out of longterm since 1982  Patient uses it intravenously  Other drugs used: Meth and cocaine but not for him  Suboxone programs in the past he stayed clear at [address] [place] [address] for at least a year    Vivitrol in the past he tried Vivitrol but he got erectile dysfunction  Last use of heroin at least a month  Patient would typically use was up to 3 g daily  He does not take fentanyl knowingly  He is overdosed a couple times in halfway  He was on 16 mg daily at [address] [place] [address]    Past Medical History:   Diagnosis Date    Allergic rhinitis     Bipolar disorder ([address]     Chronic back pain     COPD (chronic obstructive pulmonary disease) ([address]     Depression     GERD (gastroesophageal reflux disease)     Hypertension     Liver disease     Seizures ([address]     Substance abuse ([facility]     TBI (traumatic brain injury) ([facility]     Tic                     ROS     General:Patient is feeling well  Patient is not experiencing  withdrawal symptoms ,no urges or cravings  Patient is not having any side effects from the buprenorphine         PHYSICAL EXAM    Blood pressure 139/76, pulse 78, temperature 98 °F (36.7 °C), height 5' 7\" (1.702 m), weight 171 lb (77.6 kg).          General:    Musculoskeletal: No abnormal joint

## 2020-03-24 ENCOUNTER — TELEPHONE (OUTPATIENT)
Dept: INTERNAL MEDICINE CLINIC | Age: 55
End: 2020-03-24

## 2020-03-26 ENCOUNTER — HOSPITAL ENCOUNTER (OUTPATIENT)
Age: 55
Discharge: HOME | End: 2020-03-26
Payer: MEDICAID

## 2020-03-26 ENCOUNTER — HOSPITAL ENCOUNTER (OUTPATIENT)
Dept: GENERAL RADIOLOGY | Age: 55
Discharge: HOME | End: 2020-03-26
Payer: MEDICAID

## 2020-03-26 ENCOUNTER — OFFICE VISIT (OUTPATIENT)
Dept: INTERNAL MEDICINE CLINIC | Age: 55
End: 2020-03-26
Payer: MEDICAID

## 2020-03-26 VITALS
TEMPERATURE: 98.1 F | SYSTOLIC BLOOD PRESSURE: 129 MMHG | HEIGHT: 67 IN | HEART RATE: 73 BPM | WEIGHT: 172 LBS | BODY MASS INDEX: 27 KG/M2 | DIASTOLIC BLOOD PRESSURE: 84 MMHG

## 2020-03-26 PROCEDURE — 80305 DRUG TEST PRSMV DIR OPT OBS: CPT | Performed by: INTERNAL MEDICINE

## 2020-03-26 PROCEDURE — G8484 FLU IMMUNIZE NO ADMIN: HCPCS | Performed by: INTERNAL MEDICINE

## 2020-03-26 PROCEDURE — 4004F PT TOBACCO SCREEN RCVD TLK: CPT | Performed by: INTERNAL MEDICINE

## 2020-03-26 PROCEDURE — 71046 X-RAY EXAM CHEST 2 VIEWS: CPT

## 2020-03-26 PROCEDURE — G8427 DOCREV CUR MEDS BY ELIG CLIN: HCPCS | Performed by: INTERNAL MEDICINE

## 2020-03-26 PROCEDURE — G8419 CALC BMI OUT NRM PARAM NOF/U: HCPCS | Performed by: INTERNAL MEDICINE

## 2020-03-26 PROCEDURE — 72100 X-RAY EXAM L-S SPINE 2/3 VWS: CPT

## 2020-03-26 PROCEDURE — 3017F COLORECTAL CA SCREEN DOC REV: CPT | Performed by: INTERNAL MEDICINE

## 2020-03-26 PROCEDURE — 99213 OFFICE O/P EST LOW 20 MIN: CPT | Performed by: INTERNAL MEDICINE

## 2020-03-26 RX ORDER — BUPRENORPHINE AND NALOXONE 8; 2 MG/1; MG/1
1 FILM, SOLUBLE BUCCAL; SUBLINGUAL 2 TIMES DAILY
Qty: 14 FILM | Refills: 0 | Status: SHIPPED | OUTPATIENT
Start: 2020-03-26 | End: 2020-04-02

## 2020-03-26 NOTE — PROGRESS NOTES
Verbal order per Dr. Jain Him for urine drug screen. Positive for BUP THC . Verified results with John Mcmahon RN. Dr. Jain Him ordered Suboxone 8mg film BID  for patient. Verified dose with patient. Patient was sent home with 1 week script of Suboxone 8mg film BID and will be seen back in the office 3/31/2020.

## 2020-03-26 NOTE — PROGRESS NOTES
MEDICATION ASSISTED TREATMENT ENCOUNTER    HISTORY OF PRESENT ILLNESS  Patient presents for evaluation of opioid use and would like to be placed on medication assisted treatment  I saw him here for the first time 3/19  He saw Bakari Do 3/12 while I was out   patient was getting Suboxone at [address] [place] [address]  Patient was seen there for about a year  He wants to come here in order to get some counseling  He prefers not to go to [facility]  Patient has had problems using heroin  Patient started using heroin 20   years ago  Patient said he started using heroin in assisted  He was in there at least 20 years  He got out 2-1/2 years ago the longest he has been out of snf since 1982  Patient uses it intravenously  Other drugs used: Meth and cocaine but not for him  Suboxone programs in the past he stayed clear at [address] [place] [address] for at least a year    Vivitrol in the past he tried Vivitrol but he got erectile dysfunction  Last use of heroin at least a month  Patient would typically use was up to 3 g daily  He does not take fentanyl knowingly  He is overdosed a couple times in assisted  He was on 16 mg daily at [address] [place] [address]  I continued that dose  Past Medical History:   Diagnosis Date    Allergic rhinitis     Bipolar disorder ([address]     Chronic back pain     COPD (chronic obstructive pulmonary disease) ([address]     Depression     GERD (gastroesophageal reflux disease)     Hypertension     Liver disease     Seizures ([address]     Substance abuse ([facility]     TBI (traumatic brain injury) ([facility]     Tic                     ROS     General:Patient is feeling well  Patient is not experiencing  withdrawal symptoms ,no urges or cravings  Patient is not having any side effects from the buprenorphine         PHYSICAL EXAM    Blood pressure 129/84, pulse 73, temperature 98.1 °F (36.7 °C), height 5' 7\" (1.702 m), weight 172 lb (78 kg).          General: Musculoskeletal: No abnormal joint findings or muscle findings     Skin: No rashes, lesions or abnormalities noted        URINE DRUG SCREEN TODAY:  Amphetamine Screen, Urine 03/26/2020 11:00 AM Unknown   NEG    Barbiturate Screen, Urine 03/26/2020 11:00 AM Unknown   NEG    Benzodiazepine Screen, Urine 03/26/2020 11:00 AM Unknown   NEG    Buprenorphine Urine 03/26/2020 11:00 AM Unknown   POS    Cocaine Metabolite Screen, Urine 03/26/2020 11:00 AM Unknown   NEG    Gabapentin Screen, Urine 03/26/2020 11:00 AM Unknown   N/A    MDMA, Urine 03/26/2020 11:00 AM Unknown   NEG    Methamphetamine, Urine 03/26/2020 11:00 AM Unknown   NEG    Methadone Screen, Urine 03/26/2020 11:00 AM Unknown   NEG    Opiate Scrn, Ur 03/26/2020 11:00 AM Unknown   NEG    Oxycodone Screen, Ur 03/26/2020 11:00 AM Unknown   NEG    PCP Screen, Urine 03/26/2020 11:00 AM Unknown   NEG    Propoxyphene Screen, Urine 03/26/2020 11:00 AM Unknown   N/A    THC Screen, Urine 03/26/2020 11:00 AM Unknown   POS    Tricyclic Antidepressants, Urine 03/26/2020 11:00 AM Unknown   N/A              Diagnosis Orders   1. Severe opioid use disorder (HCC)  POCT Rapid Drug Screen    buprenorphine-naloxone (SUBOXONE) 8-2 MG FILM SL film   2. Hep C w/o coma, chronic (HCC)     3. Bipolar affective disorder in remission ([facility] [address] [facility]     4.  Polysubstance abuse ([facility]           PLAN:    I continued 16 mg daily Suboxone on the patient  He said he is doing well  He has no urges or cravings  Marciano Stapleton is going to help him set up meetings and counseling    I reviewed the [place] Automated Rx Reporting System report     There does not appear to be any discrepancies or overprescribing of controlled substances  He does have hep C, apparently [facility]  is going to treat  I did refill his gabapentin  Follow-up 1 week

## 2020-03-31 ENCOUNTER — TELEPHONE (OUTPATIENT)
Dept: INTERNAL MEDICINE CLINIC | Age: 55
End: 2020-03-31

## 2020-04-02 ENCOUNTER — TELEMEDICINE (OUTPATIENT)
Dept: INTERNAL MEDICINE CLINIC | Age: 55
End: 2020-04-02
Payer: MEDICAID

## 2020-04-02 PROCEDURE — G8419 CALC BMI OUT NRM PARAM NOF/U: HCPCS | Performed by: INTERNAL MEDICINE

## 2020-04-02 PROCEDURE — 99213 OFFICE O/P EST LOW 20 MIN: CPT | Performed by: INTERNAL MEDICINE

## 2020-04-02 PROCEDURE — 4004F PT TOBACCO SCREEN RCVD TLK: CPT | Performed by: INTERNAL MEDICINE

## 2020-04-02 PROCEDURE — G8428 CUR MEDS NOT DOCUMENT: HCPCS | Performed by: INTERNAL MEDICINE

## 2020-04-02 PROCEDURE — 3017F COLORECTAL CA SCREEN DOC REV: CPT | Performed by: INTERNAL MEDICINE

## 2020-04-02 RX ORDER — BUPRENORPHINE AND NALOXONE 8; 2 MG/1; MG/1
1 FILM, SOLUBLE BUCCAL; SUBLINGUAL 2 TIMES DAILY
Qty: 28 FILM | Refills: 0 | Status: SHIPPED | OUTPATIENT
Start: 2020-04-02 | End: 2020-04-16 | Stop reason: SDUPTHER

## 2020-04-02 NOTE — PROGRESS NOTES
Dr. Prachi Castillo ordered Suboxone 8mg film BID for patient. Verified dose with patient. Patient was sent home with 2 week script of Suboxone 8mg film BID and will be seen back in the office 4/16/2020.
Indicates a positive item  \"[]\" Indicates a negative item  -- DELETE ALL ITEMS NOT EXAMINED]  Not done     Constitutional: [x] Appears well-developed and well-nourished [x] No apparent distress      [] Abnormal-   Mental status  [x] Alert and awake  [] Oriented to person/place/time [x]Able to follow commands      Eyes:  EOM    [x]  Normal  [] Abnormal-  Sclera  []  Normal  [] Abnormal -         Discharge []  None visible  [] Abnormal -    HENT:   [x] Normocephalic, atraumatic. [] Abnormal   [] Mouth/Throat: Mucous membranes are moist.         NPsychiatric:       [x] Normal Affect [] No Hallucinations        [] Abnormal-           Diagnosis Orders   1. Severe opioid use disorder (HCC)  buprenorphine-naloxone (SUBOXONE) 8-2 MG FILM SL film   2. Hep C w/o coma, chronic (HCC)     3. Bipolar affective disorder in remission ([facility]     4. [facility] ([facility]         Guero Garcia is a 49 y.o. male being evaluated by a Virtual Visit (video visit) encounter to address concerns as mentioned above. A caregiver was present when appropriate. Due to this being a TeleHealth encounter (During [facility]-25 public health emergency), evaluation of the following organ systems was limited: Vitals/Constitutional/EENT/Resp/CV/GI//MS/Neuro/Skin/Heme-Lymph-Imm. Pursuant to the emergency declaration under the [facility], [address] [place] [address] and the [employer] Act, this Virtual Visit was conducted with patient's (and/or legal guardian's) consent, to reduce the patient's risk of exposure to COVID-19 and provide necessary medical care. The patient (and/or legal guardian) has also been advised to contact this office for worsening conditions or problems, and seek emergency medical treatment and/or call 911 if deemed necessary. Services were provided through a video synchronous discussion virtually to substitute for in-person clinic visit.   I

## 2020-04-16 ENCOUNTER — VIRTUAL VISIT (OUTPATIENT)
Dept: INTERNAL MEDICINE CLINIC | Age: 55
End: 2020-04-16
Payer: MEDICAID

## 2020-04-16 PROCEDURE — 99213 OFFICE O/P EST LOW 20 MIN: CPT | Performed by: INTERNAL MEDICINE

## 2020-04-16 PROCEDURE — G8419 CALC BMI OUT NRM PARAM NOF/U: HCPCS | Performed by: INTERNAL MEDICINE

## 2020-04-16 PROCEDURE — 4004F PT TOBACCO SCREEN RCVD TLK: CPT | Performed by: INTERNAL MEDICINE

## 2020-04-16 PROCEDURE — G8428 CUR MEDS NOT DOCUMENT: HCPCS | Performed by: INTERNAL MEDICINE

## 2020-04-16 PROCEDURE — 3017F COLORECTAL CA SCREEN DOC REV: CPT | Performed by: INTERNAL MEDICINE

## 2020-04-16 RX ORDER — BUPRENORPHINE AND NALOXONE 8; 2 MG/1; MG/1
1 FILM, SOLUBLE BUCCAL; SUBLINGUAL 2 TIMES DAILY
Qty: 28 FILM | Refills: 0 | Status: SHIPPED | OUTPATIENT
Start: 2020-04-16 | End: 2020-04-30 | Stop reason: SDUPTHER

## 2020-04-16 RX ORDER — GABAPENTIN 400 MG/1
400 CAPSULE ORAL 3 TIMES DAILY
Qty: 90 CAPSULE | Refills: 0 | Status: SHIPPED | OUTPATIENT
Start: 2020-04-16 | End: 2020-05-14 | Stop reason: SDUPTHER

## 2020-04-16 NOTE — PROGRESS NOTES
2020    TELEHEALTH EVALUATION -- Audio/Visual (During [facility])    HPI:  A video conference was done with the patient  Patient was at home, I was in the office  There was no  one else  present during the conference    Maria Victoria Judd (:  1965) has requested an audio/video evaluation for the following concern(s):  [employer], laid off    last saw him  on a video conference   patient was getting Suboxone at [facility]  Patient was seen there for about a year  He wants to come here in order to get some counseling  He prefers not to go to [facility]  Patient has had problems using heroin  Patient started using heroin 20   years ago  Patient said he started using heroin in prison  He was in there at least 20 years  He got out 2-1/2 years ago the longest he has been out of [facility] since   Patient uses it intravenously  Other drugs used: Meth and cocaine but not for him  He was on 16 mg daily at [facility]  I continued that dose  He says he has a new granddaughter  He also got laid off at work  Prior to Visit Medications    Medication Sig Taking? Authorizing Provider   buprenorphine-naloxone (SUBOXONE) 8-2 MG FILM SL film Place 1 Film under the tongue 2 times daily for 14 days. Yes Zayda Judd MD   gabapentin (NEURONTIN) 400 MG capsule Take 1 capsule by mouth 3 times daily for 30 days.  Yes Zayda Judd MD   buPROPion (WELLBUTRIN XL) 150 MG extended release tablet Take 1 tablet by mouth every morning for 3 days, then increase to 2 tablets every morning  Adele Mascorro PA-C   albuterol sulfate HFA (VENTOLIN HFA) 108 (90 Base) MCG/ACT inhaler Inhale 2 puffs into the lungs 4 times daily as needed for Wheezing  Tala Burrows, APRN - CNP       Social History     Tobacco Use    Smoking status: Current Every Day Smoker     Packs/day: 0.50     Years: 35.00     Pack years: 17.50     Types: Cigarettes     Start date: 1978    Smokeless tobacco: Never Used   Substance patient's (and/or legal guardian's) consent, to reduce the patient's risk of exposure to COVID-19 and provide necessary medical care. The patient (and/or legal guardian) has also been advised to contact this office for worsening conditions or problems, and seek emergency medical treatment and/or call 911 if deemed necessary. Services were provided through a video synchronous discussion virtually to substitute for in-person clinic visit. I reviewed the [place] [facility] Reporting [facility] report     There does not appear to be any discrepancies or overprescribing of controlled substances    Follow up 2 weeks  I refilled his gabapentin  --Hannah Venegas MD on 4/16/2020 at 4:55 PM    An electronic signature was used to authenticate this note.

## 2020-04-30 ENCOUNTER — VIRTUAL VISIT (OUTPATIENT)
Dept: INTERNAL MEDICINE CLINIC | Age: 55
End: 2020-04-30
Payer: MEDICAID

## 2020-04-30 PROCEDURE — 3017F COLORECTAL CA SCREEN DOC REV: CPT | Performed by: INTERNAL MEDICINE

## 2020-04-30 PROCEDURE — G8419 CALC BMI OUT NRM PARAM NOF/U: HCPCS | Performed by: INTERNAL MEDICINE

## 2020-04-30 PROCEDURE — 99213 OFFICE O/P EST LOW 20 MIN: CPT | Performed by: INTERNAL MEDICINE

## 2020-04-30 PROCEDURE — 4004F PT TOBACCO SCREEN RCVD TLK: CPT | Performed by: INTERNAL MEDICINE

## 2020-04-30 PROCEDURE — G8428 CUR MEDS NOT DOCUMENT: HCPCS | Performed by: INTERNAL MEDICINE

## 2020-04-30 RX ORDER — BUPRENORPHINE AND NALOXONE 8; 2 MG/1; MG/1
1 FILM, SOLUBLE BUCCAL; SUBLINGUAL 2 TIMES DAILY
Qty: 28 FILM | Refills: 0 | Status: SHIPPED | OUTPATIENT
Start: 2020-04-30 | End: 2020-05-14 | Stop reason: SDUPTHER

## 2020-04-30 NOTE — PROGRESS NOTES
2020    TELEHEALTH EVALUATION -- Audio/Visual (During [facility]-30 public health emergency)    HPI:  A video conference was done with the patient  Patient was at home, I was in the office  There was no  one else  present during the conference    Sherrie Moeller (:  1965) has requested an audio/video evaluation for the following concern(s):    last saw him  on a video conference   patient was getting Suboxone at [facility]  Patient was seen there for about a year  He wants to come here in order to get some counseling  He prefers not to go to [facility]  Patient has had problems using heroin  Patient started using heroin 20   years ago  Patient said he started using heroin in prison  He was in there at least 20 years  He got out 2-1/2 years ago the longest he has been out of shelter since   Patient uses it intravenously  Other drugs used: Meth and cocaine but not for him  He was on 16 mg daily at [facility]  I continued that dose  He says he has a new granddaughter  He also got laid off at work    Prior to Visit Medications    Medication Sig Taking? Authorizing Provider   buprenorphine-naloxone (SUBOXONE) 8-2 MG FILM SL film Place 1 Film under the tongue 2 times daily for 14 days. Yes Randa Peters MD   gabapentin (NEURONTIN) 400 MG capsule Take 1 capsule by mouth 3 times daily for 30 days.   Randa Peters MD   buPROPion (WELLBUTRIN XL) 150 MG extended release tablet Take 1 tablet by mouth every morning for 3 days, then increase to 2 tablets every morning  Adele Mascorro PA-C   albuterol sulfate HFA (VENTOLIN HFA) 108 (90 Base) MCG/ACT inhaler Inhale 2 puffs into the lungs 4 times daily as needed for Wheezing  Alana Alfaro, APRN - CNP       Social History     Tobacco Use    Smoking status: Current Every Day Smoker     Packs/day: 0.50     Years: 35.00     Pack years: 17.50     Types: Cigarettes     Start date: 1978    Smokeless tobacco: Never Used   Substance Use Topics    patient's (and/or legal guardian's) consent, to reduce the patient's risk of exposure to COVID-19 and provide necessary medical care. The patient (and/or legal guardian) has also been advised to contact this office for worsening conditions or problems, and seek emergency medical treatment and/or call 911 if deemed necessary. Services were provided through a video synchronous discussion virtually to substitute for in-person clinic visit. I reviewed the [place] [facility] Reporting [facility] report     There does not appear to be any discrepancies or overprescribing of controlled substances    Follow up 2 weeks in office  --Jose Chun MD on 5/2/2020 at 1:58 PM    An electronic signature was used to authenticate this note.

## 2020-05-14 ENCOUNTER — OFFICE VISIT (OUTPATIENT)
Dept: INTERNAL MEDICINE CLINIC | Age: 55
End: 2020-05-14
Payer: MEDICAID

## 2020-05-14 VITALS
BODY MASS INDEX: 26.53 KG/M2 | SYSTOLIC BLOOD PRESSURE: 185 MMHG | HEART RATE: 108 BPM | TEMPERATURE: 98 F | WEIGHT: 169 LBS | HEIGHT: 67 IN | DIASTOLIC BLOOD PRESSURE: 90 MMHG

## 2020-05-14 PROCEDURE — 99213 OFFICE O/P EST LOW 20 MIN: CPT | Performed by: INTERNAL MEDICINE

## 2020-05-14 PROCEDURE — 3017F COLORECTAL CA SCREEN DOC REV: CPT | Performed by: INTERNAL MEDICINE

## 2020-05-14 PROCEDURE — G8428 CUR MEDS NOT DOCUMENT: HCPCS | Performed by: INTERNAL MEDICINE

## 2020-05-14 PROCEDURE — 80305 DRUG TEST PRSMV DIR OPT OBS: CPT | Performed by: INTERNAL MEDICINE

## 2020-05-14 PROCEDURE — G8419 CALC BMI OUT NRM PARAM NOF/U: HCPCS | Performed by: INTERNAL MEDICINE

## 2020-05-14 PROCEDURE — 4004F PT TOBACCO SCREEN RCVD TLK: CPT | Performed by: INTERNAL MEDICINE

## 2020-05-14 RX ORDER — BUPRENORPHINE AND NALOXONE 8; 2 MG/1; MG/1
1 FILM, SOLUBLE BUCCAL; SUBLINGUAL 2 TIMES DAILY
Qty: 28 FILM | Refills: 0 | Status: SHIPPED | OUTPATIENT
Start: 2020-05-14 | End: 2020-05-28 | Stop reason: SDUPTHER

## 2020-05-14 RX ORDER — GABAPENTIN 400 MG/1
400 CAPSULE ORAL 3 TIMES DAILY
Qty: 90 CAPSULE | Refills: 0 | Status: SHIPPED | OUTPATIENT
Start: 2020-05-14 | End: 2020-06-18 | Stop reason: SDUPTHER

## 2020-05-14 NOTE — PROGRESS NOTES
MEDICATION ASSISTED TREATMENT ENCOUNTER    HISTORY OF PRESENT ILLNESS  Patient presents for evaluation of opioid use and would like to be placed on medication assisted treatment  I saw him here for the first time 3/19  We did a video visit 4/30   patient was getting Suboxone at [facility]  Patient was seen there for about a year  He wants to come here in order to get some counseling  He prefers not to go to [facility]  Patient has had problems using heroin  Patient started using heroin 20   years ago  Patient said he started using heroin in [facility]  He was in there at least 20 years  He got out 2-1/2 years ago the longest he has been out of [facility] since 1982  Patient uses it intravenously  Other drugs used: Meth and cocaine but not for him  Suboxone programs in the past he stayed clear at [address] [place] [address] for at least a year    Vivitrol in the past he tried Vivitrol but he got erectile dysfunction  Last use of heroin at least a month  Patient would typically use was up to 3 g daily  He does not take fentanyl knowingly  He is overdosed a couple times in [facility]  He was on 16 mg daily at [address] [place] [address]  I continued that dose  Past Medical History:   Diagnosis Date    Allergic rhinitis     Bipolar disorder ([address]     Chronic back pain     COPD (chronic obstructive pulmonary disease) ([address]     Depression     GERD (gastroesophageal reflux disease)     Hypertension     Liver disease     Seizures ([address]     Substance abuse ([address]     TBI (traumatic brain injury) ([address]     Tic                     ROS     General:Patient is feeling well  Patient is not experiencing  withdrawal symptoms ,no urges or cravings  Patient is not having any side effects from the buprenorphine         PHYSICAL EXAM    Blood pressure (!) 185/90, pulse 108, temperature 98 °F (36.7 °C), height 5' 7\" (1.702 m), weight 169 lb (76.7 kg).          General:    Musculoskeletal: No

## 2020-05-28 ENCOUNTER — OFFICE VISIT (OUTPATIENT)
Dept: INTERNAL MEDICINE CLINIC | Age: 55
End: 2020-05-28
Payer: MEDICAID

## 2020-05-28 VITALS
HEART RATE: 113 BPM | HEIGHT: 67 IN | DIASTOLIC BLOOD PRESSURE: 96 MMHG | WEIGHT: 167 LBS | SYSTOLIC BLOOD PRESSURE: 166 MMHG | TEMPERATURE: 98.5 F | BODY MASS INDEX: 26.21 KG/M2

## 2020-05-28 PROCEDURE — 4004F PT TOBACCO SCREEN RCVD TLK: CPT | Performed by: INTERNAL MEDICINE

## 2020-05-28 PROCEDURE — 3017F COLORECTAL CA SCREEN DOC REV: CPT | Performed by: INTERNAL MEDICINE

## 2020-05-28 PROCEDURE — G8427 DOCREV CUR MEDS BY ELIG CLIN: HCPCS | Performed by: INTERNAL MEDICINE

## 2020-05-28 PROCEDURE — 80305 DRUG TEST PRSMV DIR OPT OBS: CPT | Performed by: INTERNAL MEDICINE

## 2020-05-28 PROCEDURE — G8419 CALC BMI OUT NRM PARAM NOF/U: HCPCS | Performed by: INTERNAL MEDICINE

## 2020-05-28 PROCEDURE — 99213 OFFICE O/P EST LOW 20 MIN: CPT | Performed by: INTERNAL MEDICINE

## 2020-05-28 RX ORDER — BUPRENORPHINE AND NALOXONE 8; 2 MG/1; MG/1
1 FILM, SOLUBLE BUCCAL; SUBLINGUAL 2 TIMES DAILY
Qty: 28 FILM | Refills: 0 | Status: SHIPPED | OUTPATIENT
Start: 2020-05-28 | End: 2020-06-11 | Stop reason: SDUPTHER

## 2020-05-28 NOTE — PROGRESS NOTES
Verbal order per Dr. Saira Osorio for urine drug screen. Positive for BUP. Verified results with Cristy DOMINGUEZ Rn.    Dr. Saira Osorio ordered Suboxone 8mg film BID  for patient. Verified dose with patient. Patient was sent home with 2 week script of Suboxone 8mg film BID and will be seen back in the office 6/11/20.

## 2020-06-11 RX ORDER — BUPRENORPHINE AND NALOXONE 8; 2 MG/1; MG/1
1 FILM, SOLUBLE BUCCAL; SUBLINGUAL 2 TIMES DAILY
Qty: 14 FILM | Refills: 0 | OUTPATIENT
Start: 2020-06-11 | End: 2020-06-18 | Stop reason: SDUPTHER

## 2020-06-18 ENCOUNTER — VIRTUAL VISIT (OUTPATIENT)
Dept: PSYCHIATRY | Age: 55
End: 2020-06-18
Payer: MEDICAID

## 2020-06-18 ENCOUNTER — NURSE ONLY (OUTPATIENT)
Dept: INTERNAL MEDICINE CLINIC | Age: 55
End: 2020-06-18
Payer: MEDICAID

## 2020-06-18 VITALS
WEIGHT: 156 LBS | HEART RATE: 83 BPM | SYSTOLIC BLOOD PRESSURE: 139 MMHG | DIASTOLIC BLOOD PRESSURE: 87 MMHG | BODY MASS INDEX: 24.48 KG/M2 | HEIGHT: 67 IN | TEMPERATURE: 97.8 F

## 2020-06-18 PROCEDURE — 80305 DRUG TEST PRSMV DIR OPT OBS: CPT | Performed by: NURSE PRACTITIONER

## 2020-06-18 PROCEDURE — 99213 OFFICE O/P EST LOW 20 MIN: CPT | Performed by: PSYCHIATRY & NEUROLOGY

## 2020-06-18 PROCEDURE — 99211 OFF/OP EST MAY X REQ PHY/QHP: CPT | Performed by: NURSE PRACTITIONER

## 2020-06-18 RX ORDER — GABAPENTIN 400 MG/1
400 CAPSULE ORAL 3 TIMES DAILY
Qty: 90 CAPSULE | Refills: 0 | Status: SHIPPED | OUTPATIENT
Start: 2020-06-18 | End: 2020-07-21 | Stop reason: SDUPTHER

## 2020-06-18 RX ORDER — BUPRENORPHINE AND NALOXONE 8; 2 MG/1; MG/1
1 FILM, SOLUBLE BUCCAL; SUBLINGUAL 2 TIMES DAILY
Qty: 28 FILM | Refills: 0 | Status: SHIPPED | OUTPATIENT
Start: 2020-06-18 | End: 2020-07-02 | Stop reason: SDUPTHER

## 2020-06-18 NOTE — PROGRESS NOTES
Verbal order per Humera Read CNP for urine drug scree. Positive for BUP.  Verified results with Stefan Abbasi MA.

## 2020-06-18 NOTE — PROGRESS NOTES
Depression     GERD (gastroesophageal reflux disease)     Hypertension     Liver disease     Seizures (HCC)     Substance abuse (HCC)     TBI [facility]traumatic [facility]) ([facility]     Tic           ROS     General:Patient is feeling well  Patient is not experiencing  withdrawal symptoms ,no urges or cravings  Patient is not having any side effects from the buprenorphine     PHYSICAL EXAM     /87 (Site: Right Upper Arm, Position: Sitting, Cuff Size: Medium Adult)    Pulse 83    Temp 97.8 °F (36.6 °C)    Ht 5' 7\" (1.702 m)    Wt 156 lb (70.8 kg)    BMI 24.43 kg/m²   BSA 1.83 m²      General:    Musculoskeletal: No abnormal joint findings or muscle findings     Skin: No rashes, lesions or abnormalities noted    URINE DRUG SCREEN TODAY:  Results for orders placed or performed in visit on 06/18/20   POCT Rapid Drug Screen   Result Value Ref Range    Amphetamine Screen, Urine NEG     Barbiturate Screen, Urine NEG     Benzodiazepine Screen, Urine NEG     Buprenorphine Urine POS     Cocaine Metabolite Screen, Urine NEG     Gabapentin Screen, Urine N/A     MDMA, Urine NEG     Methamphetamine, Urine NEG     Methadone Screen, Urine NEG     Opiate Scrn, Ur NEG     Oxycodone Screen, Ur NEG     PCP Screen, Urine NEG     Propoxyphene Screen, Urine N/A     THC Screen, Urine NEG     Tricyclic Antidepressants, Urine N/A         Diagnosis Orders   1.  Severe opioid use disorder (HCC)  buprenorphine-naloxone (SUBOXONE) 8-2 MG FILM SL film     Plan:  Follow up in: 2 weeks  Medication changes: none, Continue Suboxone 8mg BID, Gabapentin 400mg TID  Verified OARRS      PLAN per Dr Saira Osorio from last visit    \"I continued 16 mg daily Suboxone on the patient  He said he is doing well  He has no urges or cravings  [facility] is going to help him set up meetings and counseling    I reviewed the [place] Automated [facility] Reporting [facility] report     There does not appear to be any discrepancies or overprescribing of controlled substances    He does have hep C,   [facility]   I did refill his gabapentin  Follow-up 2 weeks\"    Marcus Heard is a 48 y.o. male being evaluated by a Virtual Visit (video visit) encounter to address concerns as mentioned above. A caregiver was present when appropriate. Due to this being a TeleHealth encounter (During [facility] public health emergency), evaluation of the following organ systems was limited: Vitals/Constitutional/EENT/Resp/CV/GI//MS/Neuro/Skin/Heme-Lymph-Imm. Pursuant to the emergency declaration under the [address] [place] [address], [address] [place] [address] authority and the [employer] Act, this Virtual Visit was conducted with patient's (and/or legal guardian's) consent, to reduce the patient's risk of exposure to COVID-19 and provide necessary medical care. The patient (and/or legal guardian) has also been advised to contact this office for worsening conditions or problems, and seek emergency medical treatment and/or call 911 if deemed necessary. Patient identification was verified at the start of the visit: Yes  Patient is present at [address] [facility] [address] in [place] and I am present in my home at [facility] were provided through a video synchronous discussion virtually to substitute for in-person clinic visit. --Zainab Cramer MD on 6/18/2020 at 4:26 PM    An electronic signature was used to authenticate this note.

## 2020-07-02 ENCOUNTER — NURSE ONLY (OUTPATIENT)
Dept: INTERNAL MEDICINE CLINIC | Age: 55
End: 2020-07-02

## 2020-07-02 ENCOUNTER — VIRTUAL VISIT (OUTPATIENT)
Dept: PSYCHIATRY | Age: 55
End: 2020-07-02
Payer: MEDICAID

## 2020-07-02 VITALS
HEART RATE: 135 BPM | WEIGHT: 153 LBS | DIASTOLIC BLOOD PRESSURE: 107 MMHG | TEMPERATURE: 96.4 F | HEIGHT: 67 IN | BODY MASS INDEX: 24.01 KG/M2 | SYSTOLIC BLOOD PRESSURE: 170 MMHG

## 2020-07-02 PROCEDURE — 99213 OFFICE O/P EST LOW 20 MIN: CPT | Performed by: PSYCHIATRY & NEUROLOGY

## 2020-07-02 RX ORDER — BUPRENORPHINE AND NALOXONE 8; 2 MG/1; MG/1
1 FILM, SOLUBLE BUCCAL; SUBLINGUAL 2 TIMES DAILY
Qty: 28 FILM | Refills: 0 | Status: SHIPPED | OUTPATIENT
Start: 2020-07-02 | End: 2020-07-21 | Stop reason: SDUPTHER

## 2020-07-02 NOTE — PROGRESS NOTES
MEDICATION ASSISTED TREATMENT                                             Progress note                                                7/2/2020    Chief Complaint: Opioid use disorder    HISTORY OF PRESENT ILLNESS:  Reports he has been stable since last visit. Reports that his car broke down. Continue to try wean off from [facility] to get treated for Hep C.    Sleep: Good  Medication side effects: denies   Constipation: Denies any  Medication dose: Sufficient  Psychosocial stressors: Moving to a new place   Recent drug use: THC, denies any other   Attending groups: denies any    Per Dr William Baptiste, from prior visit:  Patient presents for evaluation of opioid use and would like to be placed on medication assisted treatment  I saw him here for the first time 3/19, last 5/14  We did a video visit 4/30   patient was getting Suboxone at [facility]  Patient was seen there for about a year  He wants to come here in order to get some counseling  He prefers not to go to [facility]  Patient has had problems using heroin  Patient started using heroin 20   years ago  Patient said he started using heroin in [facility]  He was in there at least 20 years  He got out 2-1/2 years ago the longest he has been out of skilled nursing since 1982  Patient uses it intravenously  Other drugs used: Meth and cocaine but not for him  Suboxone programs in the past he stayed clear at [address] [place] [address] for at least a year    Vivitrol in the past he tried Vivitrol but he got erectile dysfunction  Last use of heroin at least a month  Patient would typically use was up to 3 g daily  He does not take fentanyl knowingly  He is overdosed a couple times in [facility]  He was on 16 mg daily at [facility]  I continued that dose  Past Medical History:   Diagnosis Date    Allergic rhinitis     Bipolar disorder ([facility]     Chronic back pain     [facility]     Depression     GERD (gastroesophageal reflux disease)     Hypertension     Liver disease     Seizures ([facility]     Substance abuse ([facility]     TBI (traumatic brain injury) ([facility]     Tic        ROS     General:Patient is feeling well  Patient is not experiencing  withdrawal symptoms ,no urges or cravings  Patient is not having any side effects from the buprenorphine     PHYSICAL EXAM    /107  (Site: Right Upper Arm, Position: Sitting, Cuff Size: Medium Adult)    Pulse 135    Temp 96.4 °F (35.8 °C)    Ht 5' 7\" (1.702 m)    Wt 153 lb (69.4 kg)    BMI 23.96 kg/m²    BSA 1.81 m²        General:    Musculoskeletal: No abnormal joint findings or muscle findings     Skin: No rashes, lesions or abnormalities noted    URINE DRUG SCREEN TODAY:       Diagnosis Orders   1. Severe opioid use disorder (HCC)  buprenorphine-naloxone (SUBOXONE) 8-2 MG FILM SL film     Plan:  Follow up in: 2 weeks  Medication changes: none, Continue Suboxone 8mg BID  Verified OARRS      PLAN per Dr Tobias Martinez from last visit    \"I continued 16 mg daily Suboxone on the patient  He said he is doing well  He has no urges or cravings  [facility] is going to help him set up meetings and counseling    I reviewed the [place] Automated [facility] Reporting [facility] report     There does not appear to be any discrepancies or overprescribing of controlled substances    He does have hep C,   [facility]   I did refill his gabapentin  Follow-up 2 weeks    Chris Roa is a 48 y.o. male being evaluated by a Virtual Visit (video visit) encounter to address concerns as mentioned above. A caregiver was present when appropriate. Due to this being a TeleHealth encounter (During [facility]-70 public health emergency), evaluation of the following organ systems was limited: Vitals/Constitutional/EENT/Resp/CV/GI//MS/Neuro/Skin/Heme-Lymph-Imm.   Pursuant to the emergency declaration under the [facility], [facility] waiver authority and the [employer] Act, this Virtual Visit was conducted with patient's (and/or legal guardian's) consent, to reduce the patient's risk of exposure to COVID-19 and provide necessary medical care. The patient (and/or legal guardian) has also been advised to contact this office for worsening conditions or problems, and seek emergency medical treatment and/or call 911 if deemed necessary. Patient identification was verified at the start of the visit: Yes  Patient is present at [address] [facility] [address] in [address] [place] [address] and I am present in my home at [facility] were provided through a video synchronous discussion virtually to substitute for in-person clinic visit. --Colletta Amis, MD on 7/2/2020 at 4:31 PM    An electronic signature was used to authenticate this note.

## 2020-07-21 ENCOUNTER — OFFICE VISIT (OUTPATIENT)
Dept: INTERNAL MEDICINE CLINIC | Age: 55
End: 2020-07-21
Payer: MEDICAID

## 2020-07-21 VITALS
WEIGHT: 157.8 LBS | RESPIRATION RATE: 12 BRPM | DIASTOLIC BLOOD PRESSURE: 68 MMHG | HEART RATE: 105 BPM | BODY MASS INDEX: 24.77 KG/M2 | SYSTOLIC BLOOD PRESSURE: 152 MMHG | TEMPERATURE: 97.2 F | HEIGHT: 67 IN

## 2020-07-21 LAB
AMPHETAMINE SCREEN, URINE: NORMAL
BARBITURATE SCREEN, URINE: NORMAL
BENZODIAZEPINE SCREEN, URINE: NORMAL
BUPRENORPHINE URINE: NORMAL
COCAINE METABOLITE SCREEN URINE: NORMAL
GABAPENTIN SCREEN, URINE: NORMAL
MDMA URINE: NORMAL
METHADONE SCREEN, URINE: NORMAL
METHAMPHETAMINE, URINE: NORMAL
OPIATE SCREEN URINE: NORMAL
OXYCODONE SCREEN URINE: NORMAL
PHENCYCLIDINE SCREEN URINE: NORMAL
PROPOXYPHENE SCREEN, URINE: NORMAL
THC SCREEN, URINE: NORMAL
TRICYCLIC ANTIDEPRESSANTS, UR: NORMAL

## 2020-07-21 PROCEDURE — G8420 CALC BMI NORM PARAMETERS: HCPCS | Performed by: INTERNAL MEDICINE

## 2020-07-21 PROCEDURE — 80305 DRUG TEST PRSMV DIR OPT OBS: CPT | Performed by: INTERNAL MEDICINE

## 2020-07-21 PROCEDURE — G8427 DOCREV CUR MEDS BY ELIG CLIN: HCPCS | Performed by: INTERNAL MEDICINE

## 2020-07-21 PROCEDURE — 3017F COLORECTAL CA SCREEN DOC REV: CPT | Performed by: INTERNAL MEDICINE

## 2020-07-21 PROCEDURE — 4004F PT TOBACCO SCREEN RCVD TLK: CPT | Performed by: INTERNAL MEDICINE

## 2020-07-21 PROCEDURE — 99213 OFFICE O/P EST LOW 20 MIN: CPT | Performed by: INTERNAL MEDICINE

## 2020-07-21 RX ORDER — BUPRENORPHINE AND NALOXONE 8; 2 MG/1; MG/1
1 FILM, SOLUBLE BUCCAL; SUBLINGUAL 2 TIMES DAILY
Qty: 14 FILM | Refills: 0 | Status: SHIPPED | OUTPATIENT
Start: 2020-07-21 | End: 2020-12-02 | Stop reason: SDUPTHER

## 2020-07-21 RX ORDER — GABAPENTIN 400 MG/1
400 CAPSULE ORAL 3 TIMES DAILY
Qty: 90 CAPSULE | Refills: 0 | Status: SHIPPED | OUTPATIENT
Start: 2020-07-21 | End: 2020-12-02
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
## 2020-12-02 ENCOUNTER — OFFICE VISIT (OUTPATIENT)
Dept: INTERNAL MEDICINE CLINIC | Age: 55
End: 2020-12-02
Payer: MEDICAID

## 2020-12-02 VITALS
BODY MASS INDEX: 25.74 KG/M2 | HEIGHT: 67 IN | WEIGHT: 164 LBS | HEART RATE: 97 BPM | DIASTOLIC BLOOD PRESSURE: 88 MMHG | SYSTOLIC BLOOD PRESSURE: 157 MMHG | TEMPERATURE: 97.4 F

## 2020-12-02 LAB
ALCOHOL URINE: ABNORMAL
AMPHETAMINE SCREEN, URINE: ABNORMAL
BARBITURATE SCREEN, URINE: ABNORMAL
BENZODIAZEPINE SCREEN, URINE: ABNORMAL
BUPRENORPHINE URINE: ABNORMAL
COCAINE METABOLITE SCREEN URINE: ABNORMAL
FENTANYL SCREEN, URINE: ABNORMAL
GABAPENTIN SCREEN, URINE: ABNORMAL
MDMA URINE: ABNORMAL
METHADONE SCREEN, URINE: ABNORMAL
METHAMPHETAMINE, URINE: ABNORMAL
OPIATE SCREEN URINE: ABNORMAL
OXYCODONE SCREEN URINE: ABNORMAL
PHENCYCLIDINE SCREEN URINE: ABNORMAL
PROPOXYPHENE SCREEN, URINE: ABNORMAL
SYNTHETIC CANNABINOIDS(K2) SCREEN, URINE: ABNORMAL
THC SCREEN, URINE: ABNORMAL
TRAMADOL SCREEN URINE: ABNORMAL
TRICYCLIC ANTIDEPRESSANTS, UR: ABNORMAL

## 2020-12-02 PROCEDURE — 4004F PT TOBACCO SCREEN RCVD TLK: CPT | Performed by: INTERNAL MEDICINE

## 2020-12-02 PROCEDURE — 80305 DRUG TEST PRSMV DIR OPT OBS: CPT | Performed by: INTERNAL MEDICINE

## 2020-12-02 PROCEDURE — 3017F COLORECTAL CA SCREEN DOC REV: CPT | Performed by: INTERNAL MEDICINE

## 2020-12-02 PROCEDURE — G8419 CALC BMI OUT NRM PARAM NOF/U: HCPCS | Performed by: INTERNAL MEDICINE

## 2020-12-02 PROCEDURE — 99213 OFFICE O/P EST LOW 20 MIN: CPT | Performed by: INTERNAL MEDICINE

## 2020-12-02 PROCEDURE — G8484 FLU IMMUNIZE NO ADMIN: HCPCS | Performed by: INTERNAL MEDICINE

## 2020-12-02 PROCEDURE — G8427 DOCREV CUR MEDS BY ELIG CLIN: HCPCS | Performed by: INTERNAL MEDICINE

## 2020-12-02 RX ORDER — BUPRENORPHINE AND NALOXONE 8; 2 MG/1; MG/1
1 FILM, SOLUBLE BUCCAL; SUBLINGUAL 2 TIMES DAILY
Qty: 12 FILM | Refills: 0 | Status: SHIPPED | OUTPATIENT
Start: 2020-12-02 | End: 2020-12-07 | Stop reason: SDUPTHER

## 2020-12-02 NOTE — PROGRESS NOTES
MEDICATION ASSISTED TREATMENT ENCOUNTER    HISTORY OF PRESENT ILLNESS  Patient presents for evaluation of opioid use and would like to be placed on medication assisted treatment  I last saw him here in July  Shortly thereafter he went to retirement for 150 days  He said he got out a couple months ago  He has been getting Suboxone off the street since he got out  The other day he used some meth  He said he used heroin/fentanyl the day he got out of long-term  He ihasnot used opiates in 2 weeks  He said he needs to get back on Suboxone legally  Last Suboxone was yesterday  Past Medical History:   Diagnosis Date    Allergic rhinitis     [facility] [address]     Chronic back pain     COPD [facility]chronic obstructive pulmonary disease) ([facility] [address]     Depression     GERD (gastroesophageal reflux disease)     Hypertension     Liver disease     Seizures ([facility] [address]     Substance abuse ([facility] [address]     TBI (traumatic brain injury) ([facility] [address]     Tic                     ROS     General: Somewhat distraught  He and his wife usually are very close but now there is a distance between them     PHYSICAL EXAM    Blood pressure (!) 157/88, pulse 97, temperature 97.4 °F (36.3 °C), height 5' 7\" (1.702 m), weight 164 lb (74.4 kg).          General:    Musculoskeletal: No abnormal joint findings or muscle findings     Skin: No rashes, lesions or abnormalities noted  Mental Status Examination:  Level of consciousness:  within normal limits and awake  Appearance:  well-appearing, in chair, good grooming and good hygiene  Behavior/Motor:  {Normal  Attitude toward examiner:  cooperative and attentive  Speech:  spontaneous and normal volume  Mood: Upbeat  Affect:  mood congruent  Thought processes:  linear  Thought content:  Denies homicidal ideations  Suicidal Ideation:  denies suicidal ideation  Delusions:  no evidence of delusions  Perceptual Disturbance:  denies any perceptual disturbance  Cognition:  oriented to person, place, and time    Insight : poor  Judgment: poor  Medication Side Effects:none        URINE DRUG SCREEN TODAY:  Alcohol, Urine  12/02/2020  3:10 PM  Unknown    NEG    Amphetamine Screen, Urine  12/02/2020  3:10 PM  Unknown    POS    Barbiturate Screen, Urine  12/02/2020  3:10 PM  Unknown    NEG    Benzodiazepine Screen, Urine  12/02/2020  3:10 PM  Unknown    NEG    Buprenorphine Urine  12/02/2020  3:10 PM  Unknown    POS    Cocaine Metabolite Screen, Urine  12/02/2020  3:10 PM  Unknown    NEG    FENTANYL SCREEN, URINE  12/02/2020  3:10 PM  Unknown    NEG    Gabapentin Screen, Urine  12/02/2020  3:10 PM  Unknown    N/A    MDMA, Urine  12/02/2020  3:10 PM  Unknown    POS    Methadone Screen, Urine  12/02/2020  3:10 PM  Unknown    NEG    Methamphetamine, Urine  12/02/2020  3:10 PM  Unknown    POS    Opiate Scrn, Ur  12/02/2020  3:10 PM  Unknown    NEG    Oxycodone Screen, Ur  12/02/2020  3:10 PM  Unknown    NEG    PCP Screen, Urine  12/02/2020  3:10 PM  Unknown    NEG    Propoxyphene Screen, Urine  12/02/2020  3:10 PM  Unknown    N/A    Synthetic Cannabinoids (K2) Screen, Urine  12/02/2020  3:10 PM  Unknown    NEG    THC Screen, Urine  12/02/2020  3:10 PM  Unknown    POS    Tramadol Scrn, Ur  12/02/2020  3:10 PM  Unknown    NEG    Tricyclic Antidepressants, Urine  12/02/2020  3:10 PM  Unknown    N/A           Diagnosis Orders   1. Severe opioid use disorder (HCC)  POCT Rapid Drug Screen    buprenorphine-naloxone (SUBOXONE) 8-2 MG FILM SL film   2. Hep C w/o coma, chronic (HCC)     3. Bipolar affective disorder in remission ([facility] [address]     4. Polysubstance abuse ([facility] [address]     5.  Encounter for monitoring Suboxone maintenance therapy           PLAN:    Urine has meth, buprenorphine, amphetamines  No opiates    I reviewed the [place] Automated Rx Reporting System report     There does not appear to be any discrepancies or overprescribing of controlled substances    He does have hep C, Will give him Suboxone 8 mg twice daily    Follow-up 1 week

## 2020-12-07 ENCOUNTER — OFFICE VISIT (OUTPATIENT)
Dept: INTERNAL MEDICINE CLINIC | Age: 55
End: 2020-12-07
Payer: MEDICAID

## 2020-12-07 VITALS — TEMPERATURE: 97.2 F | BODY MASS INDEX: 25.37 KG/M2 | WEIGHT: 162 LBS

## 2020-12-07 PROCEDURE — G8419 CALC BMI OUT NRM PARAM NOF/U: HCPCS | Performed by: INTERNAL MEDICINE

## 2020-12-07 PROCEDURE — G8484 FLU IMMUNIZE NO ADMIN: HCPCS | Performed by: INTERNAL MEDICINE

## 2020-12-07 PROCEDURE — 80305 DRUG TEST PRSMV DIR OPT OBS: CPT | Performed by: INTERNAL MEDICINE

## 2020-12-07 PROCEDURE — 99213 OFFICE O/P EST LOW 20 MIN: CPT | Performed by: INTERNAL MEDICINE

## 2020-12-07 PROCEDURE — G8427 DOCREV CUR MEDS BY ELIG CLIN: HCPCS | Performed by: INTERNAL MEDICINE

## 2020-12-07 PROCEDURE — 3017F COLORECTAL CA SCREEN DOC REV: CPT | Performed by: INTERNAL MEDICINE

## 2020-12-07 PROCEDURE — 4004F PT TOBACCO SCREEN RCVD TLK: CPT | Performed by: INTERNAL MEDICINE

## 2020-12-07 RX ORDER — BUPRENORPHINE AND NALOXONE 8; 2 MG/1; MG/1
1 FILM, SOLUBLE BUCCAL; SUBLINGUAL 2 TIMES DAILY
Qty: 14 FILM | Refills: 0 | Status: SHIPPED | OUTPATIENT
Start: 2020-12-07 | End: 2021-02-10 | Stop reason: SDUPTHER

## 2020-12-07 NOTE — PROGRESS NOTES
MEDICATION ASSISTED TREATMENT ENCOUNTER    HISTORY OF PRESENT ILLNESS  Patient presents for evaluation of opioid use and would like to be placed on medication assisted treatment  I last saw him here `12/2   I restarted him on Suboxone  He did have meth last visit he says he has not done any since  No opiates   Shortly thereafter he went to senior care for 150 days  He said he got out a couple months ago  He has been getting Suboxone off the street since he got out    He said he used heroin/fentanyl the day he got out of senior living  He has not used opiates in 2 weeks    Past Medical History:   Diagnosis Date    Allergic rhinitis     B[facility] disorder ([facility] [address]     Chronic back pain     COPD (chronic obstructive pulmonary disease) ([facility] [address]     Depression     GERD (gastroesophageal reflux disease)     Hypertension     Liver disease     Seizures ([facility] [address]     Substance abuse ([facility] [address]     TBI (traumatic brain injury) ([facility] [address]     Tic                     ROS     General: Somewhat distraught  He and his wife usually are very close but now there is a distance between them     PHYSICAL EXAM    Temperature 97.2 °F (36.2 °C), weight 162 lb (73.5 kg).          General:    Musculoskeletal: No abnormal joint findings or muscle findings     Skin: No rashes, lesions or abnormalities noted  Mental Status Examination:  Level of consciousness:  within normal limits and awake  Appearance:  well-appearing, in chair, good grooming and good hygiene  Behavior/Motor:  {Normal  Attitude toward examiner:  cooperative and attentive  Speech:  spontaneous and normal volume  Mood: Upbeat  Affect:  mood congruent  Thought processes:  linear  Thought content:  Denies homicidal ideations  Suicidal Ideation:  denies suicidal ideation  Delusions:  no evidence of delusions  Perceptual Disturbance:  denies any perceptual disturbance  Cognition:  oriented to person, place, and time    Insight : poor  Judgment: poor  Medication Side Effects:none        URINE DRUG SCREEN TODAY:  Alcohol, Urine  12/07/2020  3:10 PM  Unknown    NEG    Amphetamine Screen, Urine  12/07/2020  3:10 PM  Unknown    POS    Barbiturate Screen, Urine  12/07/2020  3:10 PM  Unknown    NEG    Benzodiazepine Screen, Urine  12/07/2020  3:10 PM  Unknown    NEG    Buprenorphine Urine  12/07/2020  3:10 PM  Unknown    POS    Cocaine Metabolite Screen, Urine  12/07/2020  3:10 PM  Unknown    NEG    FENTANYL SCREEN, URINE  12/07/2020  3:10 PM  Unknown    NEG    Gabapentin Screen, Urine  12/07/2020  3:10 PM  Unknown    N/A    MDMA, Urine  12/07/2020  3:10 PM  Unknown    POS    Methadone Screen, Urine  12/07/2020  3:10 PM  Unknown    NEG    Methamphetamine, Urine  12/07/2020  3:10 PM  Unknown    POS    Opiate Scrn, Ur  12/07/2020  3:10 PM  Unknown    NEG    Oxycodone Screen, Ur  12/07/2020  3:10 PM  Unknown    NEG    PCP Screen, Urine  12/07/2020  3:10 PM  Unknown    NEG    Propoxyphene Screen, Urine  12/07/2020  3:10 PM  Unknown    N/A    Synthetic Cannabinoids (K2) Screen, Urine  12/07/2020  3:10 PM  Unknown    NEG    THC Screen, Urine  12/07/2020  3:10 PM  Unknown    POS    Tramadol Scrn, Ur  12/07/2020  3:10 PM  Unknown    NEG    Tricyclic Antidepressants, Urine  12/07/2020  3:10 PM  Unknown    N/A         Diagnosis Orders   1. Severe opioid use disorder (HCC)  POCT Rapid Drug Screen    buprenorphine-naloxone (SUBOXONE) 8-2 MG FILM SL film   2. Hep C w/o coma, chronic (HCC)     3. Bipolar affective disorder in remission ([facility] [address]     4. Polysubstance abuse ([facility] [address]     5.  Encounter for monitoring Suboxone maintenance therapy           PLAN:    Urine has meth, buprenorphine, amphetamines  MDMA, methamphetamines    I reviewed the [place] Automated Rx Reporting System report     There does not appear to be any discrepancies or overprescribing of controlled substances    He does have hep C,   Will give him Suboxone 8 mg twice daily  I told him I would not give him Neurontin  He does have hypertension blood sugars have been high in the past  I will set him up with Josiane Rosales as his PCP  Follow-up 1 week  I told him if he keeps using meth I will not give him Suboxone

## 2020-12-07 NOTE — PROGRESS NOTES
Verbal order per Dr. Armin Tena for urine drug screen. Positive for BUP, AMP, MET, MDMA, THC. Verified results with RAHEEL Johnson.    Dr. Armin Tena ordered Suboxone 8 mg film BID for patient. Verified dose with patient. Patient was sent home with 1 week script for Suboxone 8 mg film BID and will be seen back in the office on 12/14/20. UC sent.

## 2021-02-10 ENCOUNTER — OFFICE VISIT (OUTPATIENT)
Dept: INTERNAL MEDICINE CLINIC | Age: 56
End: 2021-02-10
Payer: MEDICAID

## 2021-02-10 VITALS
TEMPERATURE: 97 F | BODY MASS INDEX: 25.43 KG/M2 | SYSTOLIC BLOOD PRESSURE: 167 MMHG | WEIGHT: 162 LBS | HEIGHT: 67 IN | DIASTOLIC BLOOD PRESSURE: 104 MMHG | HEART RATE: 116 BPM

## 2021-02-10 DIAGNOSIS — Z79.899 ENCOUNTER FOR MONITORING SUBOXONE MAINTENANCE THERAPY: ICD-10-CM

## 2021-02-10 DIAGNOSIS — F31.70 BIPOLAR AFFECTIVE DISORDER IN REMISSION (HCC): ICD-10-CM

## 2021-02-10 DIAGNOSIS — Z51.81 ENCOUNTER FOR MONITORING SUBOXONE MAINTENANCE THERAPY: ICD-10-CM

## 2021-02-10 DIAGNOSIS — B18.2 HEP C W/O COMA, CHRONIC (HCC): ICD-10-CM

## 2021-02-10 DIAGNOSIS — F19.10 POLYSUBSTANCE ABUSE (HCC): ICD-10-CM

## 2021-02-10 DIAGNOSIS — F11.20 SEVERE OPIOID USE DISORDER (HCC): Primary | ICD-10-CM

## 2021-02-10 PROCEDURE — 80305 DRUG TEST PRSMV DIR OPT OBS: CPT | Performed by: INTERNAL MEDICINE

## 2021-02-10 PROCEDURE — 4004F PT TOBACCO SCREEN RCVD TLK: CPT | Performed by: INTERNAL MEDICINE

## 2021-02-10 PROCEDURE — G8484 FLU IMMUNIZE NO ADMIN: HCPCS | Performed by: INTERNAL MEDICINE

## 2021-02-10 PROCEDURE — G8427 DOCREV CUR MEDS BY ELIG CLIN: HCPCS | Performed by: INTERNAL MEDICINE

## 2021-02-10 PROCEDURE — 99213 OFFICE O/P EST LOW 20 MIN: CPT | Performed by: INTERNAL MEDICINE

## 2021-02-10 PROCEDURE — 3017F COLORECTAL CA SCREEN DOC REV: CPT | Performed by: INTERNAL MEDICINE

## 2021-02-10 PROCEDURE — G8419 CALC BMI OUT NRM PARAM NOF/U: HCPCS | Performed by: INTERNAL MEDICINE

## 2021-02-10 RX ORDER — BUPRENORPHINE AND NALOXONE 8; 2 MG/1; MG/1
1 FILM, SOLUBLE BUCCAL; SUBLINGUAL 2 TIMES DAILY
Qty: 12 FILM | Refills: 0 | Status: SHIPPED | OUTPATIENT
Start: 2021-02-10 | End: 2021-02-15 | Stop reason: SDUPTHER

## 2021-02-10 NOTE — PROGRESS NOTES
Verbal order per Dr. Moira Guzman for urine drug screen. Positive for BUP, [place], THC. Verified results with Nixon Ashton LPN. Dr. Moira Guzman ordered Suboxone 8 mg film BID for patient. Verified dose with patient. Patient was sent home with 6 day script for Suboxone 8  Mg film BID and will be seen back in the office on 2/15/21.

## 2021-02-10 NOTE — PROGRESS NOTES
MEDICATION ASSISTED TREATMENT ENCOUNTER    HISTORY OF PRESENT ILLNESS  Patient presents for evaluation of opioid use and would like to be placed on medication assisted treatment  I last saw him here `12/7  He was lost to follow-up since then  He went to prison for [facility] days got out 2 1/2 weeks ago  He took a couple Vicodin a week or so ago  He has not used heroin or meth  He does smoke pot    He did cocaine 3 days ago      Past Medical History:   Diagnosis Date    Allergic rhinitis     [facility] [address]     Chronic back pain     COPD (chronic obstructive pulmonary disease) ([facility] [address]     Depression     GERD (gastroesophageal reflux disease)     Hypertension     Liver disease     Seizures ([facility] [address]     Substance abuse ([facility] [address]     TBI (traumatic brain injury) ([facility] [address]     Tic                     ROS     General: Somewhat distraught  He and his wife usually are very close but now there is a distance between them     PHYSICAL EXAM    Blood pressure (!) 167/104, pulse 116, temperature 97 °F (36.1 °C), height 5' 7\" (1.702 m), weight 162 lb (73.5 kg).          General:    Musculoskeletal: No abnormal joint findings or muscle findings     Skin: No rashes, lesions or abnormalities noted  Mental Status Examination:  Level of consciousness:  within normal limits and awake  Appearance:  well-appearing, in chair, good grooming and good hygiene  Behavior/Motor:  {Normal  Attitude toward examiner:  cooperative and attentive  Speech:  spontaneous and normal volume  Mood: Upbeat  Affect:  mood congruent  Thought processes:  linear  Thought content:  Denies homicidal ideations  Suicidal Ideation:  denies suicidal ideation  Delusions:  no evidence of delusions  Perceptual Disturbance:  denies any perceptual disturbance  Cognition:  oriented to person, place, and time    Insight : poor  Judgment: poor  Medication Side Effects:none        URINE DRUG SCREEN TODAY: Alcohol, Urine 02/10/2021 11:15 AM Unknown   NEG    Amphetamine Screen, Urine 02/10/2021 11:15 AM Unknown   NEG    Barbiturate Screen, Urine 02/10/2021 11:15 AM Unknown   NEG    Benzodiazepine Screen, Urine 02/10/2021 11:15 AM Unknown   NEG    Buprenorphine Urine 02/10/2021 11:15 AM Unknown   POS    Cocaine Metabolite Screen, Urine 02/10/2021 11:15 AM Unknown   POS    FENTANYL SCREEN, URINE 02/10/2021 11:15 AM Unknown   NEG    Gabapentin Screen, Urine 02/10/2021 11:15 AM Unknown   N/A    MDMA, Urine 02/10/2021 11:15 AM Unknown   NEG    Methadone Screen, Urine 02/10/2021 11:15 AM Unknown   NEG    Methamphetamine, Urine 02/10/2021 11:15 AM Unknown   NEG    Opiate Scrn, Ur 02/10/2021 11:15 AM Unknown   NEG    Oxycodone Screen, Ur 02/10/2021 11:15 AM Unknown   NEG    PCP Screen, Urine 02/10/2021 11:15 AM Unknown   NEG    Propoxyphene Screen, Urine 02/10/2021 11:15 AM Unknown   N/A    Synthetic Cannabinoids (K2) Screen, Urine 02/10/2021 11:15 AM Unknown   NEG    THC Screen, Urine 02/10/2021 11:15 AM Unknown   POS    Tramadol Scrn, Ur 02/10/2021 11:15 AM Unknown   NEG    Tricyclic Antidepressants, Urine 02/10/2021 11:15 AM Unknown   N/A         Diagnosis Orders   1. Severe opioid use disorder (HCC)  POCT Rapid Drug Screen    DISCONTINUED: buprenorphine-naloxone (SUBOXONE) 8-2 MG FILM SL film   2. Hep C w/o coma, chronic (HCC)     3. Polysubstance abuse ([facility] [address]     4. Bipolar affective disorder in remission ([facility] [address]     5.  Encounter for monitoring Suboxone maintenance therapy           PLAN:    Urine has buprenorphine, cocaine, THC  I reviewed the [place] Automated Rx Reporting System report     There does not appear to be any discrepancies or overprescribing of controlled substances    He does have hep C,   Will give him Suboxone 8 mg twice daily  I told him I would not give him Neurontin  He does have hypertension blood sugars have been high in the past  I will set him up with Malachi Sim as his PCP  Follow-up 5 days

## 2021-02-15 ENCOUNTER — OFFICE VISIT (OUTPATIENT)
Dept: INTERNAL MEDICINE CLINIC | Age: 56
End: 2021-02-15
Payer: MEDICAID

## 2021-02-15 VITALS
HEART RATE: 97 BPM | HEIGHT: 67 IN | BODY MASS INDEX: 25.11 KG/M2 | WEIGHT: 160 LBS | DIASTOLIC BLOOD PRESSURE: 100 MMHG | TEMPERATURE: 98.1 F | SYSTOLIC BLOOD PRESSURE: 147 MMHG

## 2021-02-15 DIAGNOSIS — F31.70 BIPOLAR AFFECTIVE DISORDER IN REMISSION (HCC): ICD-10-CM

## 2021-02-15 DIAGNOSIS — B18.2 HEP C W/O COMA, CHRONIC (HCC): ICD-10-CM

## 2021-02-15 DIAGNOSIS — Z79.899 ENCOUNTER FOR MONITORING SUBOXONE MAINTENANCE THERAPY: ICD-10-CM

## 2021-02-15 DIAGNOSIS — F11.20 SEVERE OPIOID USE DISORDER (HCC): Primary | ICD-10-CM

## 2021-02-15 DIAGNOSIS — F19.10 POLYSUBSTANCE ABUSE (HCC): ICD-10-CM

## 2021-02-15 DIAGNOSIS — Z51.81 ENCOUNTER FOR MONITORING SUBOXONE MAINTENANCE THERAPY: ICD-10-CM

## 2021-02-15 PROCEDURE — 80305 DRUG TEST PRSMV DIR OPT OBS: CPT | Performed by: INTERNAL MEDICINE

## 2021-02-15 PROCEDURE — G8484 FLU IMMUNIZE NO ADMIN: HCPCS | Performed by: INTERNAL MEDICINE

## 2021-02-15 PROCEDURE — G8419 CALC BMI OUT NRM PARAM NOF/U: HCPCS | Performed by: INTERNAL MEDICINE

## 2021-02-15 PROCEDURE — 99213 OFFICE O/P EST LOW 20 MIN: CPT | Performed by: INTERNAL MEDICINE

## 2021-02-15 PROCEDURE — G8428 CUR MEDS NOT DOCUMENT: HCPCS | Performed by: INTERNAL MEDICINE

## 2021-02-15 PROCEDURE — 3017F COLORECTAL CA SCREEN DOC REV: CPT | Performed by: INTERNAL MEDICINE

## 2021-02-15 PROCEDURE — 4004F PT TOBACCO SCREEN RCVD TLK: CPT | Performed by: INTERNAL MEDICINE

## 2021-02-15 RX ORDER — BUPRENORPHINE AND NALOXONE 8; 2 MG/1; MG/1
1 FILM, SOLUBLE BUCCAL; SUBLINGUAL 2 TIMES DAILY
Qty: 8 FILM | Refills: 0 | Status: SHIPPED | OUTPATIENT
Start: 2021-02-15 | End: 2021-02-22 | Stop reason: SDUPTHER

## 2021-02-15 NOTE — PROGRESS NOTES
Verbal order per Dr. Emanuel Leon for urine drug screen. Positive for BUP. Verified results with Tan Liao RN. Dr. Emanuel Leon ordered Suboxone 8mg film BID  for patient. Verified dose with patient. Patient was sent home with 4 day script of Suboxone 8mg film BID and will be seen back in the office 2/18/21.

## 2021-02-15 NOTE — PROGRESS NOTES
MEDICATION ASSISTED TREATMENT ENCOUNTER    HISTORY OF PRESENT ILLNESS  Patient presents for evaluation of opioid use and would like to be placed on medication assisted treatment  I last saw him here `2/10  He was lost to follow-up since December  He went to [facility] for 30 days got out 2 1/2 weeks ago  He took a couple Vicodin a week or so ago  He has not used heroin or meth  He does smoke pot    He did cocaine 3 days ago      Past Medical History:   Diagnosis Date    Allergic rhinitis     B[facility] disorder ([facility] [address]     Chronic back pain     COPD (chronic obstructive pulmonary disease) ([facility] [address]     Depression     GERD (gastroesophageal reflux disease)     Hypertension     Liver disease     Seizures ([facility] [address]     Substance abuse ([facility] [address]     TBI (traumatic brain injury) ([facility] [address]     Tic                     ROS     General: Somewhat distraught  He and his wife usually are very close but now there is a distance between them     PHYSICAL EXAM    Blood pressure (!) 147/100, pulse 97, temperature 98.1 °F (36.7 °C), height 5' 7\" (1.702 m), weight 160 lb (72.6 kg).          General:    Musculoskeletal: No abnormal joint findings or muscle findings     Skin: No rashes, lesions or abnormalities noted  Mental Status Examination:  Level of consciousness:  within normal limits and awake  Appearance:  well-appearing, in chair, good grooming and good hygiene  Behavior/Motor:  {Normal  Attitude toward examiner:  cooperative and attentive  Speech:  spontaneous and normal volume  Mood: Upbeat  Affect:  mood congruent  Thought processes:  linear  Thought content:  Denies homicidal ideations  Suicidal Ideation:  denies suicidal ideation  Delusions:  no evidence of delusions  Perceptual Disturbance:  denies any perceptual disturbance  Cognition:  oriented to person, place, and time    Insight : poor  Judgment: poor  Medication Side Effects:none        URINE DRUG SCREEN TODAY: Alcohol, Urine 02/15/2021  3:30 PM Unknown   NEG    Amphetamine Screen, Urine 02/15/2021  3:30 PM Unknown   NEG    Barbiturate Screen, Urine 02/15/2021  3:30 PM Unknown   NEG    Benzodiazepine Screen, Urine 02/15/2021  3:30 PM Unknown   NEG    Buprenorphine Urine 02/15/2021  3:30 PM Unknown   POS    Cocaine Metabolite Screen, Urine 02/15/2021  3:30 PM Unknown   NEG    FENTANYL SCREEN, URINE 02/15/2021  3:30 PM Unknown   NEG    Gabapentin Screen, Urine 02/15/2021  3:30 PM Unknown   N/A    MDMA, Urine 02/15/2021  3:30 PM Unknown   NEG    Methadone Screen, Urine 02/15/2021  3:30 PM Unknown   NEG    Methamphetamine, Urine 02/15/2021  3:30 PM Unknown   NEG    Opiate Scrn, Ur 02/15/2021  3:30 PM Unknown   NEG    Oxycodone Screen, Ur 02/15/2021  3:30 PM Unknown   NEG    PCP Screen, Urine 02/15/2021  3:30 PM Unknown   NEG    Propoxyphene Screen, Urine 02/15/2021  3:30 PM Unknown   N/A    Synthetic Cannabinoids (K2) Screen, Urine 02/15/2021  3:30 PM Unknown   NEG    THC Screen, Urine 02/15/2021  3:30 PM Unknown   NEG    Tramadol Scrn, Ur 02/15/2021  3:30 PM Unknown   NEG    Tricyclic Antidepressants, Urine 02/15/2021  3:30 PM Unknown   N/A         Diagnosis Orders   1. Severe opioid use disorder (HCC)  POCT Rapid Drug Screen    buprenorphine-naloxone (SUBOXONE) 8-2 MG FILM SL film   2. Hep C w/o coma, chronic (HCC)     3. Bipolar affective disorder in remission ([facility] [address]     4. Polysubstance abuse ([facility] [address]     5.  Encounter for monitoring Suboxone maintenance therapy           PLAN:    Urine has meth, buprenorphine, amphetamines  MDMA, methamphetamines    I reviewed the [place] Automated Rx Reporting System report     There does not appear to be any discrepancies or overprescribing of controlled substances    He does have hep C,   Will give him Suboxone 8 mg twice daily  I told him I would not give him Neurontin  He does have hypertension blood sugars have been high in the past  I will set him up with Kishore Zarco as his PCP Follow-up here 2/18

## 2021-02-18 ENCOUNTER — TELEPHONE (OUTPATIENT)
Dept: INTERNAL MEDICINE CLINIC | Age: 56
End: 2021-02-18

## 2021-02-18 NOTE — TELEPHONE ENCOUNTER
HYACINTHN called [phone] left VM stating patient needed to call office back to r/s appt before 4:30pm when office closes for the day.

## 2021-02-18 NOTE — TELEPHONE ENCOUNTER
LPN received call from [phone] stating patient missed his appt and needed office to call her back. Caller did not state who she was.

## 2021-02-22 ENCOUNTER — OFFICE VISIT (OUTPATIENT)
Dept: INTERNAL MEDICINE CLINIC | Age: 56
End: 2021-02-22
Payer: MEDICAID

## 2021-02-22 VITALS
TEMPERATURE: 96.6 F | HEIGHT: 67 IN | SYSTOLIC BLOOD PRESSURE: 182 MMHG | DIASTOLIC BLOOD PRESSURE: 112 MMHG | WEIGHT: 174 LBS | BODY MASS INDEX: 27.31 KG/M2 | HEART RATE: 119 BPM

## 2021-02-22 DIAGNOSIS — Z51.81 ENCOUNTER FOR MONITORING SUBOXONE MAINTENANCE THERAPY: ICD-10-CM

## 2021-02-22 DIAGNOSIS — Z79.899 ENCOUNTER FOR MONITORING SUBOXONE MAINTENANCE THERAPY: ICD-10-CM

## 2021-02-22 DIAGNOSIS — F11.20 SEVERE OPIOID USE DISORDER (HCC): Primary | ICD-10-CM

## 2021-02-22 DIAGNOSIS — B18.2 HEP C W/O COMA, CHRONIC (HCC): ICD-10-CM

## 2021-02-22 DIAGNOSIS — F31.70 BIPOLAR AFFECTIVE DISORDER IN REMISSION (HCC): ICD-10-CM

## 2021-02-22 DIAGNOSIS — F19.10 POLYSUBSTANCE ABUSE (HCC): ICD-10-CM

## 2021-02-22 PROCEDURE — 4004F PT TOBACCO SCREEN RCVD TLK: CPT | Performed by: INTERNAL MEDICINE

## 2021-02-22 PROCEDURE — G8427 DOCREV CUR MEDS BY ELIG CLIN: HCPCS | Performed by: INTERNAL MEDICINE

## 2021-02-22 PROCEDURE — 80305 DRUG TEST PRSMV DIR OPT OBS: CPT | Performed by: INTERNAL MEDICINE

## 2021-02-22 PROCEDURE — 99213 OFFICE O/P EST LOW 20 MIN: CPT | Performed by: INTERNAL MEDICINE

## 2021-02-22 PROCEDURE — G8484 FLU IMMUNIZE NO ADMIN: HCPCS | Performed by: INTERNAL MEDICINE

## 2021-02-22 PROCEDURE — 3017F COLORECTAL CA SCREEN DOC REV: CPT | Performed by: INTERNAL MEDICINE

## 2021-02-22 PROCEDURE — G8419 CALC BMI OUT NRM PARAM NOF/U: HCPCS | Performed by: INTERNAL MEDICINE

## 2021-02-22 RX ORDER — BUPRENORPHINE AND NALOXONE 8; 2 MG/1; MG/1
1 FILM, SOLUBLE BUCCAL; SUBLINGUAL 2 TIMES DAILY
Qty: 14 FILM | Refills: 0 | Status: SHIPPED | OUTPATIENT
Start: 2021-02-22 | End: 2021-03-01 | Stop reason: SDUPTHER

## 2021-02-22 NOTE — PROGRESS NOTES
MEDICATION ASSISTED TREATMENT ENCOUNTER    HISTORY OF PRESENT ILLNESS  Patient presents for evaluation of opioid use and would like to be placed on medication assisted treatment  I last saw him here `2/15  He missed his appointment last week due to weather  He was lost to follow-up since December  [facility] [place] [facility] [place] [facility] got out 2 1/2 weeks ago  He took a couple Vicodin a week or so ago  He has not used heroin or meth  He does smoke pot    He did cocaine 8 days ago      Past Medical History:   Diagnosis Date    Allergic rhinitis     B[facility] disorder ([facility] [address]     Chronic back pain     COPD (chronic obstructive pulmonary disease) ([facility] [address]     Depression     GERD (gastroesophageal reflux disease)     Hypertension     Liver disease     Seizures ([facility] [address]     Substance abuse ([facility]     TBI (traumatic brain injury) ([facility]     Tic                     ROS     General: Somewhat distraught  He and his wife usually are very close but now there is a distance between them     PHYSICAL EXAM    Blood pressure (!) 182/112, pulse 119, temperature 96.6 °F (35.9 °C), height 5' 7\" (1.702 m), weight 174 lb (78.9 kg).          General:    Musculoskeletal: No abnormal joint findings or muscle findings     Skin: No rashes, lesions or abnormalities noted  Mental Status Examination:  Level of consciousness:  within normal limits and awake  Appearance:  well-appearing, in chair, good grooming and good hygiene  Behavior/Motor:  {Normal  Attitude toward examiner:  cooperative and attentive  Speech:  spontaneous and normal volume  Mood: Upbeat  Affect:  mood congruent  Thought processes:  linear  Thought content:  Denies homicidal ideations  Suicidal Ideation:  denies suicidal ideation  Delusions:  no evidence of delusions  Perceptual Disturbance:  denies any perceptual disturbance  Cognition:  oriented to person, place, and time    Insight : poor  Judgment: poor Medication Side Effects:none        URINE DRUG SCREEN TODAY:  Alcohol, Urine 02/22/2021  3:20 PM Unknown   NEG    Amphetamine Screen, Urine 02/22/2021  3:20 PM Unknown   NEG    Barbiturate Screen, Urine 02/22/2021  3:20 PM Unknown   NEG    Benzodiazepine Screen, Urine 02/22/2021  3:20 PM Unknown   NEG    Buprenorphine Urine 02/22/2021  3:20 PM Unknown   POS    Cocaine Metabolite Screen, Urine 02/22/2021  3:20 PM Unknown   NEG    FENTANYL SCREEN, URINE 02/22/2021  3:20 PM Unknown   NEG    Gabapentin Screen, Urine 02/22/2021  3:20 PM Unknown   N/A    MDMA, Urine 02/22/2021  3:20 PM Unknown   NEG    Methadone Screen, Urine 02/22/2021  3:20 PM Unknown   NEG    Methamphetamine, Urine 02/22/2021  3:20 PM Unknown   NEG    Opiate Scrn, Ur 02/22/2021  3:20 PM Unknown   NEG    Oxycodone Screen, Ur 02/22/2021  3:20 PM Unknown   NEG    PCP Screen, Urine 02/22/2021  3:20 PM Unknown   NEG    Propoxyphene Screen, Urine 02/22/2021  3:20 PM Unknown   N/A    Synthetic Cannabinoids (K2) Screen, Urine 02/22/2021  3:20 PM Unknown   NEG    THC Screen, Urine 02/22/2021  3:20 PM Unknown   NEG    Tramadol Scrn, Ur 02/22/2021  3:20 PM Unknown   NEG    Tricyclic Antidepressants, Urine 02/22/2021  3:20 PM Unknown   N/A         Diagnosis Orders   1. Severe opioid use disorder (HCC)  POCT Rapid Drug Screen    buprenorphine-naloxone (SUBOXONE) 8-2 MG FILM SL film   2. Hep C w/o coma, chronic (HCC)     3. Polysubstance abuse ([facility] [address]     4. Bipolar affective disorder in remission ([facility] [address]     5.  Encounter for monitoring Suboxone maintenance therapy           PLAN:    Urine has buprenorphine only   We will send comprehensive urine  I reviewed the [place] Automated Rx Reporting System report     There does not appear to be any discrepancies or overprescribing of controlled substances    He does have hep C,   Will give him Suboxone 8 mg twice daily  I told him I would not give him Neurontin    Follow-up here 1 week

## 2021-02-22 NOTE — PROGRESS NOTES
Verbal order per Dr. Andree Araujo for urine drug screen. Positive for BUP. Verified results with Radha Wilson LPN. Dr. Andree Araujo ordered Suboxone 8 mg film BID for patient. Verified dose with patient. Patient was sent home with 1 week script for Suboone 8 mg film BID and will be seen back in the office on 3/1/21. UC sent.

## 2021-03-01 ENCOUNTER — OFFICE VISIT (OUTPATIENT)
Dept: INTERNAL MEDICINE CLINIC | Age: 56
End: 2021-03-01
Payer: MEDICAID

## 2021-03-01 VITALS
DIASTOLIC BLOOD PRESSURE: 66 MMHG | WEIGHT: 162 LBS | BODY MASS INDEX: 25.37 KG/M2 | SYSTOLIC BLOOD PRESSURE: 94 MMHG | HEART RATE: 92 BPM | TEMPERATURE: 97.8 F

## 2021-03-01 DIAGNOSIS — F11.20 SEVERE OPIOID USE DISORDER (HCC): Primary | ICD-10-CM

## 2021-03-01 DIAGNOSIS — B18.2 HEP C W/O COMA, CHRONIC (HCC): ICD-10-CM

## 2021-03-01 DIAGNOSIS — F31.70 BIPOLAR AFFECTIVE DISORDER IN REMISSION (HCC): ICD-10-CM

## 2021-03-01 DIAGNOSIS — Z51.81 ENCOUNTER FOR MONITORING SUBOXONE MAINTENANCE THERAPY: ICD-10-CM

## 2021-03-01 DIAGNOSIS — Z79.899 ENCOUNTER FOR MONITORING SUBOXONE MAINTENANCE THERAPY: ICD-10-CM

## 2021-03-01 DIAGNOSIS — F19.10 POLYSUBSTANCE ABUSE (HCC): ICD-10-CM

## 2021-03-01 PROCEDURE — G8484 FLU IMMUNIZE NO ADMIN: HCPCS | Performed by: INTERNAL MEDICINE

## 2021-03-01 PROCEDURE — 99213 OFFICE O/P EST LOW 20 MIN: CPT | Performed by: INTERNAL MEDICINE

## 2021-03-01 PROCEDURE — G8419 CALC BMI OUT NRM PARAM NOF/U: HCPCS | Performed by: INTERNAL MEDICINE

## 2021-03-01 PROCEDURE — 4004F PT TOBACCO SCREEN RCVD TLK: CPT | Performed by: INTERNAL MEDICINE

## 2021-03-01 PROCEDURE — 3017F COLORECTAL CA SCREEN DOC REV: CPT | Performed by: INTERNAL MEDICINE

## 2021-03-01 PROCEDURE — 80305 DRUG TEST PRSMV DIR OPT OBS: CPT | Performed by: INTERNAL MEDICINE

## 2021-03-01 PROCEDURE — G8428 CUR MEDS NOT DOCUMENT: HCPCS | Performed by: INTERNAL MEDICINE

## 2021-03-01 RX ORDER — BUPRENORPHINE AND NALOXONE 8; 2 MG/1; MG/1
1 FILM, SOLUBLE BUCCAL; SUBLINGUAL 2 TIMES DAILY
Qty: 14 FILM | Refills: 0 | Status: SHIPPED | OUTPATIENT
Start: 2021-03-01 | End: 2021-03-08

## 2021-03-01 NOTE — PROGRESS NOTES
Verbal order per Dr. Travis Belcher for urine drug screen. Positive for BUP, MET. Verified results with Mack Marie LPN. Dr. Travis Belcher ordered Suboxone 8 mg film BID for patient. Verified dose with patient. Patient was sent home with 1 week script for Suboxone 8 mg film BID and will be seen back in the office on 3/8/21. Uc and oral sent.

## 2021-03-01 NOTE — PROGRESS NOTES
MEDICATION ASSISTED TREATMENT ENCOUNTER    HISTORY OF PRESENT ILLNESS  Patient presents for evaluation of opioid use and would like to be placed on medication assisted treatment  I last saw him here `2/22  He missed his appointment last week due to weather  He was lost to follow-up since December  He went to senior care for 30 days got out 2 1/2 weeks ago  He took a couple Vicodin a week or so ago  He has not used heroin or meth  He does smoke pot    He did cocaine 8 days ago      Past Medical History:   Diagnosis Date    Allergic rhinitis     [facility]     Chronic back pain     COPD [facility]chronic obstructive pulmonary disease) [facility]     Depression     GERD (gastroesophageal reflux disease)     Hypertension     Liver disease     Seizures ([facility]     [facility]     TBI (traumatic brain injury) ([facility]     Tic                     ROS     General: Somewhat distraught  He and his wife usually are very close but now there is a distance between them     PHYSICAL EXAM    Blood pressure 94/66, pulse 92, temperature 97.8 °F (36.6 °C), weight 162 lb (73.5 kg).          General:    Musculoskeletal: No abnormal joint findings or muscle findings     Skin: No rashes, lesions or abnormalities noted  Mental Status Examination:  Level of consciousness:  within normal limits and awake  Appearance:  well-appearing, in chair, good grooming and good hygiene  Behavior/Motor:  {Normal  Attitude toward examiner:  cooperative and attentive  Speech:  spontaneous and normal volume  Mood: Upbeat  Affect:  mood congruent  Thought processes:  linear  Thought content:  Denies homicidal ideations  Suicidal Ideation:  denies suicidal ideation  Delusions:  no evidence of delusions  Perceptual Disturbance:  denies any perceptual disturbance  Cognition:  oriented to person, place, and time    Insight : poor  Judgment: poor  Medication Side Effects:none        URINE DRUG SCREEN TODAY:  Alcohol, Urine 03/01/2021  3:47 PM Unknown   NEG    Amphetamine Screen, Urine 03/01/2021  3:47 PM Unknown   NEG    Barbiturate Screen, Urine 03/01/2021  3:47 PM Unknown   NEG    Benzodiazepine Screen, Urine 03/01/2021  3:47 PM Unknown   NEG    Buprenorphine Urine 03/01/2021  3:47 PM Unknown   POS    Cocaine Metabolite Screen, Urine 03/01/2021  3:47 PM Unknown   NEG    FENTANYL SCREEN, URINE 03/01/2021  3:47 PM Unknown   NEG    Gabapentin Screen, Urine 03/01/2021  3:47 PM Unknown   N/A    MDMA, Urine 03/01/2021  3:47 PM Unknown   NEG    Methadone Screen, Urine 03/01/2021  3:47 PM Unknown   NEG    Methamphetamine, Urine 03/01/2021  3:47 PM Unknown   POS    Opiate Scrn, Ur 03/01/2021  3:47 PM Unknown   NEG    Oxycodone Screen, Ur 03/01/2021  3:47 PM Unknown   NEG    PCP Screen, Urine 03/01/2021  3:47 PM Unknown   NEG    Propoxyphene Screen, Urine 03/01/2021  3:47 PM Unknown   N/A    Synthetic Cannabinoids (K2) Screen, Urine 03/01/2021  3:47 PM Unknown   NEG    THC Screen, Urine 03/01/2021  3:47 PM Unknown   NEG    Tramadol Scrn, Ur 03/01/2021  3:47 PM Unknown   NEG    Tricyclic Antidepressants, Urine 03/01/2021  3:47 PM Unknown   N/A         Diagnosis Orders   1. Severe opioid use disorder (HCC)  POCT Rapid Drug Screen    buprenorphine-naloxone (SUBOXONE) 8-2 MG FILM SL film   2. Hep C w/o coma, chronic (HCC)     3. Polysubstance abuse ([facility]     4. Bipolar affective disorder in remission ([facility]     5.  Encounter for monitoring Suboxone maintenance therapy           PLAN:    Urine has buprenorphine and meth    I reviewed the [place] Automated [facility] Reporting System report     There does not appear to be any discrepancies or overprescribing of controlled substances    He does have hep C,  We have not checked a viral load since 2017 which was positive  I told the patient that I ordered it and to get it done  Will give him Suboxone 8 mg twice daily  I told him I would not give him Neurontin  Oral swab, comprehensive urine sent  Follow-up here 1 week

## 2021-06-25 ENCOUNTER — NURSE ONLY (OUTPATIENT)
Dept: INTERNAL MEDICINE CLINIC | Age: 56
End: 2021-06-25
Payer: MEDICAID

## 2021-06-25 VITALS
HEART RATE: 89 BPM | BODY MASS INDEX: 24.8 KG/M2 | WEIGHT: 158 LBS | HEIGHT: 67 IN | SYSTOLIC BLOOD PRESSURE: 158 MMHG | TEMPERATURE: 97.9 F | DIASTOLIC BLOOD PRESSURE: 68 MMHG

## 2021-06-25 DIAGNOSIS — F11.20 SEVERE OPIOID USE DISORDER (HCC): Primary | ICD-10-CM

## 2021-06-25 PROCEDURE — 80305 DRUG TEST PRSMV DIR OPT OBS: CPT | Performed by: NURSE PRACTITIONER

## 2021-06-25 RX ORDER — BUPRENORPHINE AND NALOXONE 8; 2 MG/1; MG/1
1 FILM, SOLUBLE BUCCAL; SUBLINGUAL 2 TIMES DAILY
Qty: 8 FILM | Refills: 0 | Status: SHIPPED | OUTPATIENT
Start: 2021-06-25 | End: 2021-06-29

## 2021-06-25 RX ORDER — BUPRENORPHINE AND NALOXONE 8; 2 MG/1; MG/1
1 FILM, SOLUBLE BUCCAL; SUBLINGUAL 2 TIMES DAILY
COMMUNITY
End: 2021-06-30 | Stop reason: SDUPTHER

## 2021-06-25 RX ORDER — AMOXICILLIN 500 MG/1
500 CAPSULE ORAL 2 TIMES DAILY
Qty: 14 CAPSULE | Refills: 0 | Status: SHIPPED | OUTPATIENT
Start: 2021-06-25 | End: 2021-07-02

## 2021-06-25 NOTE — PROGRESS NOTES
Verbal order per Staci Slater CNPfor urine drug screen. Positive for BUP AMP METH FTY THC. Verified results with Scott Crain RN. Dr. Randa Clarke ordered Suboxone 8mg film BID for patient. Verified dose with patient. Patient was sent home with 4 day script of Suboxone 8mg film BID and Amoxicillin 500mg BID for 7 days and will be seen back in the office 6/29/21.

## 2021-06-29 ENCOUNTER — OFFICE VISIT (OUTPATIENT)
Dept: INTERNAL MEDICINE CLINIC | Age: 56
End: 2021-06-29
Payer: MEDICAID

## 2021-06-29 VITALS
SYSTOLIC BLOOD PRESSURE: 157 MMHG | HEIGHT: 67 IN | HEART RATE: 112 BPM | TEMPERATURE: 96.8 F | WEIGHT: 161 LBS | BODY MASS INDEX: 25.27 KG/M2 | DIASTOLIC BLOOD PRESSURE: 101 MMHG

## 2021-06-29 DIAGNOSIS — Z79.899 ENCOUNTER FOR MONITORING SUBOXONE MAINTENANCE THERAPY: ICD-10-CM

## 2021-06-29 DIAGNOSIS — Z51.81 ENCOUNTER FOR MONITORING SUBOXONE MAINTENANCE THERAPY: ICD-10-CM

## 2021-06-29 DIAGNOSIS — F11.20 SEVERE OPIOID USE DISORDER (HCC): Primary | ICD-10-CM

## 2021-06-29 DIAGNOSIS — B18.2 HEP C W/O COMA, CHRONIC (HCC): ICD-10-CM

## 2021-06-29 DIAGNOSIS — F19.10 POLYSUBSTANCE ABUSE (HCC): ICD-10-CM

## 2021-06-29 DIAGNOSIS — F31.70 BIPOLAR AFFECTIVE DISORDER IN REMISSION (HCC): ICD-10-CM

## 2021-06-29 PROCEDURE — 4004F PT TOBACCO SCREEN RCVD TLK: CPT | Performed by: INTERNAL MEDICINE

## 2021-06-29 PROCEDURE — 80305 DRUG TEST PRSMV DIR OPT OBS: CPT | Performed by: INTERNAL MEDICINE

## 2021-06-29 PROCEDURE — 3017F COLORECTAL CA SCREEN DOC REV: CPT | Performed by: INTERNAL MEDICINE

## 2021-06-29 PROCEDURE — 99214 OFFICE O/P EST MOD 30 MIN: CPT | Performed by: INTERNAL MEDICINE

## 2021-06-29 PROCEDURE — G8419 CALC BMI OUT NRM PARAM NOF/U: HCPCS | Performed by: INTERNAL MEDICINE

## 2021-06-29 PROCEDURE — G8428 CUR MEDS NOT DOCUMENT: HCPCS | Performed by: INTERNAL MEDICINE
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
## 2021-06-29 NOTE — PROGRESS NOTES
Verbal order per Dr. Alyssia Farooq for urine drug screen. Positive for MET, THC. Verified results with Zia Mendez LPN. Dr. Alyssia Farooq ordered Suboxone 12 mg film daily for patient. Verified dose with patient. Patient was sent home with Suboxone 12 mg film qty 1 and will be seen back in the office on 6/30/21.

## 2021-06-30 ENCOUNTER — OFFICE VISIT (OUTPATIENT)
Dept: INTERNAL MEDICINE CLINIC | Age: 56
End: 2021-06-30
Payer: MEDICAID

## 2021-06-30 VITALS
BODY MASS INDEX: 25.43 KG/M2 | TEMPERATURE: 97.8 F | HEIGHT: 67 IN | DIASTOLIC BLOOD PRESSURE: 110 MMHG | SYSTOLIC BLOOD PRESSURE: 172 MMHG | HEART RATE: 102 BPM | WEIGHT: 162 LBS

## 2021-06-30 DIAGNOSIS — T40.2X4D OPIOID OVERDOSE, UNDETERMINED INTENT, SUBSEQUENT ENCOUNTER: ICD-10-CM

## 2021-06-30 DIAGNOSIS — F11.20 SEVERE OPIOID USE DISORDER (HCC): Primary | ICD-10-CM

## 2021-06-30 DIAGNOSIS — F31.70 BIPOLAR AFFECTIVE DISORDER IN REMISSION (HCC): ICD-10-CM

## 2021-06-30 DIAGNOSIS — Z79.899 ENCOUNTER FOR MONITORING SUBOXONE MAINTENANCE THERAPY: ICD-10-CM

## 2021-06-30 DIAGNOSIS — Z51.81 ENCOUNTER FOR MONITORING SUBOXONE MAINTENANCE THERAPY: ICD-10-CM

## 2021-06-30 DIAGNOSIS — B18.2 HEP C W/O COMA, CHRONIC (HCC): ICD-10-CM

## 2021-06-30 DIAGNOSIS — I71.20 THORACIC AORTIC ANEURYSM WITHOUT RUPTURE: ICD-10-CM

## 2021-06-30 PROCEDURE — G8419 CALC BMI OUT NRM PARAM NOF/U: HCPCS | Performed by: INTERNAL MEDICINE

## 2021-06-30 PROCEDURE — 80305 DRUG TEST PRSMV DIR OPT OBS: CPT | Performed by: INTERNAL MEDICINE

## 2021-06-30 PROCEDURE — G8428 CUR MEDS NOT DOCUMENT: HCPCS | Performed by: INTERNAL MEDICINE

## 2021-06-30 PROCEDURE — 3017F COLORECTAL CA SCREEN DOC REV: CPT | Performed by: INTERNAL MEDICINE

## 2021-06-30 PROCEDURE — 99213 OFFICE O/P EST LOW 20 MIN: CPT | Performed by: INTERNAL MEDICINE

## 2021-06-30 PROCEDURE — 4004F PT TOBACCO SCREEN RCVD TLK: CPT | Performed by: INTERNAL MEDICINE

## 2021-06-30 RX ORDER — BUPRENORPHINE AND NALOXONE 8; 2 MG/1; MG/1
1 FILM, SOLUBLE BUCCAL; SUBLINGUAL 2 TIMES DAILY
Qty: 14 FILM | Refills: 0 | Status: SHIPPED | OUTPATIENT
Start: 2021-06-30 | End: 2021-07-06 | Stop reason: SDUPTHER

## 2021-06-30 NOTE — PROGRESS NOTES
Verbal order per Dr. Kayla Byrne for urine drug screen. Positive for BUP AMP METH THC. Verified results with Anahi Guzman RN. Dr. Kayla Byrne ordered Suboxone 8mg film BID  for patient. Verified dose with patient. Patient was sent home with 1 week script of Suboxone 8mg film BID and will be seen back in the office 7/7/21.

## 2021-06-30 NOTE — PROGRESS NOTES
MEDICATION ASSISTED TREATMENT ENCOUNTER    HISTORY OF PRESENT ILLNESS  Patient presents for evaluation of opioid use and would like to be placed on medication assisted treatment  I saw the patient here yesterday 6/29 for the first time since March  I put him back on Suboxone   a week ago the patient did some meth and heroin together  He overdosed  He is actually been doing this for the last couple of months  He said the rescue squad had to Narcan him 3 times  He said he was admitted for a day to [facility] but signed himself out the next day  They told him he had a heart issue, they did an echocardiogram  He is back with his girlfriend she had kicked him out because he was using he was on the street for couple of months    He did some meth the day after he got out of the hospital  He has not done any opiates since he went to the hospital  He thinks he has been out of the hospital about 10 days  He says he is taking little pieces of Suboxone throughout the day he took 1 this morning      Past Medical History:   Diagnosis Date    Allergic rhinitis     Bipolar disorder ([address]     Chronic back pain     COPD (chronic obstructive pulmonary disease) ([address]     Depression     GERD (gastroesophageal reflux disease)     Hypertension     Liver disease     Seizures ([address]     Substance abuse [facility]     TBI (traumatic brain injury) ([address]     Tic                     ROS     General: Feeling better     PHYSICAL EXAM    Blood pressure (!) 172/110, pulse 102, temperature 97.8 °F (36.6 °C), height 5' 7\" (1.702 m), weight 162 lb (73.5 kg).          General:    Musculoskeletal: No abnormal joint findings or muscle findings     Skin: No rashes, lesions or abnormalities noted  Mental Status Examination:  Level of consciousness:  within normal limits and awake  Appearance:  well-appearing, in chair, good grooming and good hygiene  Behavior/Motor:  {Normal  Attitude toward examiner:  cooperative and attentive  Speech:  spontaneous and normal volume  Mood: Upbeat  Affect:  mood congruent  Thought processes:  linear  Thought content:  Denies homicidal ideations  Suicidal Ideation:  denies suicidal ideation  Delusions:  no evidence of delusions  Perceptual Disturbance:  denies any perceptual disturbance  Cognition:  oriented to person, place, and time    Insight : poor  Judgment: poor  Medication Side Effects:none        URINE DRUG SCREEN TODAY:  Alcohol, Urine 06/30/2021 10:37 AM Unknown   NEG    Amphetamine Screen, Urine 06/30/2021 10:37 AM Unknown   POS    Barbiturate Screen, Urine 06/30/2021 10:37 AM Unknown   NEG    Benzodiazepine Screen, Urine 06/30/2021 10:37 AM Unknown   NEG    Buprenorphine Urine 06/30/2021 10:37 AM Unknown   POS    Cocaine Metabolite Screen, Urine 06/30/2021 10:37 AM Unknown   NEG    FENTANYL SCREEN, URINE 06/30/2021 10:37 AM Unknown   NEG    Gabapentin Screen, Urine 06/30/2021 10:37 AM Unknown   N/A    MDMA, Urine 06/30/2021 10:37 AM Unknown   NEG    Methadone Screen, Urine 06/30/2021 10:37 AM Unknown   NEG    Methamphetamine, Urine 06/30/2021 10:37 AM Unknown   POS    Opiate Scrn, Ur 06/30/2021 10:37 AM Unknown   NEG    Oxycodone Screen, Ur 06/30/2021 10:37 AM Unknown   NEG    PCP Screen, Urine 06/30/2021 10:37 AM Unknown   NEG    Propoxyphene Screen, Urine 06/30/2021 10:37 AM Unknown   N/A    Synthetic Cannabinoids (K2) Screen, Urine 06/30/2021 10:37 AM Unknown   NEG    THC Screen, Urine 06/30/2021 10:37 AM Unknown   POS    Tramadol Scrn, Ur 06/30/2021 10:37 AM Unknown   NEG    Tricyclic Antidepressants, Urine 06/30/2021 10:37 AM Unknown   N/A        PLAN:    Urine has buprenorphine and meth and amphetamines      He does have hep C,  I reviewed his CT of the chest  He has a 6.5 cm ascending thoracic aortic aneurysm  We will refer to cardiovascular surgery  We have not checked a viral load since 2017 which was positive  I told the patient that I ordered it and to get it done  Will give him Suboxone 8 mg twice daily  He said his overdose was his come to [facility] 7 moment  Follow-up here 7/7

## 2021-07-02 PROBLEM — G40.A09 ABSENCE SEIZURE (HCC): Status: ACTIVE | Noted: 2021-07-02

## 2021-07-02 PROBLEM — J44.9 CHRONIC OBSTRUCTIVE PULMONARY DISEASE, UNSPECIFIED COPD TYPE (HCC): Status: ACTIVE | Noted: 2021-07-02

## 2021-07-06 ENCOUNTER — OFFICE VISIT (OUTPATIENT)
Dept: INTERNAL MEDICINE CLINIC | Age: 56
End: 2021-07-06
Payer: MEDICAID

## 2021-07-06 VITALS
TEMPERATURE: 97.5 F | SYSTOLIC BLOOD PRESSURE: 162 MMHG | HEIGHT: 67 IN | DIASTOLIC BLOOD PRESSURE: 107 MMHG | BODY MASS INDEX: 24.48 KG/M2 | HEART RATE: 102 BPM | WEIGHT: 156 LBS

## 2021-07-06 DIAGNOSIS — B18.2 HEP C W/O COMA, CHRONIC (HCC): ICD-10-CM

## 2021-07-06 DIAGNOSIS — F31.70 BIPOLAR AFFECTIVE DISORDER IN REMISSION (HCC): ICD-10-CM

## 2021-07-06 DIAGNOSIS — I71.20 THORACIC AORTIC ANEURYSM WITHOUT RUPTURE: ICD-10-CM

## 2021-07-06 DIAGNOSIS — F11.20 SEVERE OPIOID USE DISORDER (HCC): Primary | ICD-10-CM

## 2021-07-06 DIAGNOSIS — F19.10 POLYSUBSTANCE ABUSE (HCC): ICD-10-CM

## 2021-07-06 PROCEDURE — G8427 DOCREV CUR MEDS BY ELIG CLIN: HCPCS | Performed by: INTERNAL MEDICINE

## 2021-07-06 PROCEDURE — G8420 CALC BMI NORM PARAMETERS: HCPCS | Performed by: INTERNAL MEDICINE

## 2021-07-06 PROCEDURE — 3017F COLORECTAL CA SCREEN DOC REV: CPT | Performed by: INTERNAL MEDICINE

## 2021-07-06 PROCEDURE — 80305 DRUG TEST PRSMV DIR OPT OBS: CPT | Performed by: INTERNAL MEDICINE

## 2021-07-06 PROCEDURE — 4004F PT TOBACCO SCREEN RCVD TLK: CPT | Performed by: INTERNAL MEDICINE

## 2021-07-06 PROCEDURE — 99213 OFFICE O/P EST LOW 20 MIN: CPT | Performed by: INTERNAL MEDICINE

## 2021-07-06 RX ORDER — NALOXONE HYDROCHLORIDE 4 MG/.1ML
SPRAY NASAL
COMMUNITY
Start: 2021-06-28

## 2021-07-06 RX ORDER — BUPRENORPHINE AND NALOXONE 8; 2 MG/1; MG/1
1 FILM, SOLUBLE BUCCAL; SUBLINGUAL 2 TIMES DAILY
Qty: 14 FILM | Refills: 0 | Status: SHIPPED | OUTPATIENT
Start: 2021-07-06 | End: 2021-07-13 | Stop reason: SDUPTHER

## 2021-07-06 NOTE — PROGRESS NOTES
MEDICATION ASSISTED TREATMENT ENCOUNTER    HISTORY OF PRESENT ILLNESS  Patient presents for evaluation of opioid use and would like to be placed on medication assisted treatment  I saw the patient here yesterday 6/29 for the first time since March  I saw him 6/30 as well  I put him back on Suboxone  I put him back on Suboxone   a week ago the patient did some meth and heroin together  He overdosed  He is actually been doing this for the last couple of months  He said the rescue squad had to Narcan him 3 times  He said he was admitted for a day to [facility] but signed himself out the next day  They told him he had a heart issue, they did an echocardiogram  He is back with his girlfriend she had kicked him out because he was using he was on the street for couple of months    He did some meth the day after he got out of the hospital  He has not done any opiates since he went to the hospital  He thinks he has been out of the hospital about 10 days  He says he is taking little pieces of Suboxone throughout the day he took 1 this morning      Past Medical History:   Diagnosis Date    Allergic rhinitis     Bipolar disorder ([address]     Chronic back pain     COPD (chronic obstructive pulmonary disease) ([address]     Depression     GERD (gastroesophageal reflux disease)     Hypertension     Liver disease     Seizures ([address]     Substance abuse ([address]     TBI (traumatic brain injury) ([address]     Tic                     ROS     General: Feeling better     PHYSICAL EXAM    Blood pressure (!) 162/107, pulse 102, temperature 97.5 °F (36.4 °C), height 5' 7\" (1.702 m), weight 156 lb (70.8 kg).          General:    Musculoskeletal: No abnormal joint findings or muscle findings     Skin: No rashes, lesions or abnormalities noted  Mental Status Examination:  Level of consciousness:  within normal limits and awake  Appearance:  well-appearing, in chair, good grooming and good hygiene  Behavior/Motor:  {Normal  Attitude toward examiner:  cooperative and attentive  Speech:  spontaneous and normal volume  Mood: Upbeat  Affect:  mood congruent  Thought processes:  linear  Thought content:  Denies homicidal ideations  Suicidal Ideation:  denies suicidal ideation  Delusions:  no evidence of delusions  Perceptual Disturbance:  denies any perceptual disturbance  Cognition:  oriented to person, place, and time    Insight : poor  Judgment: poor  Medication Side Effects:none        URINE DRUG SCREEN TODAY:  Alcohol, Urine 07/06/2021  1:55 PM Unknown   NEG    Amphetamine Screen, Urine 07/06/2021  1:55 PM Unknown   NEG    Barbiturate Screen, Urine 07/06/2021  1:55 PM Unknown   NEG    Benzodiazepine Screen, Urine 07/06/2021  1:55 PM Unknown   NEG    Buprenorphine Urine 07/06/2021  1:55 PM Unknown   POS    Cocaine Metabolite Screen, Urine 07/06/2021  1:55 PM Unknown   NEG    FENTANYL SCREEN, URINE 07/06/2021  1:55 PM Unknown   NEG    Gabapentin Screen, Urine 07/06/2021  1:55 PM Unknown   N/A    MDMA, Urine 07/06/2021  1:55 PM Unknown   NEG    Methadone Screen, Urine 07/06/2021  1:55 PM Unknown   NEG    Methamphetamine, Urine 07/06/2021  1:55 PM Unknown   POS    Opiate Scrn, Ur 07/06/2021  1:55 PM Unknown   NEG    Oxycodone Screen, Ur 07/06/2021  1:55 PM Unknown   NEG    PCP Screen, Urine 07/06/2021  1:55 PM Unknown   NEG    Propoxyphene Screen, Urine 07/06/2021  1:55 PM Unknown   N/A    Synthetic Cannabinoids (K2) Screen, Urine 07/06/2021  1:55 PM Unknown   NEG    THC Screen, Urine 07/06/2021  1:55 PM Unknown   NEG    Tramadol Scrn, Ur 07/06/2021  1:55 PM Unknown   NEG    Tricyclic Antidepressants, Urine 07/06/2021  1:55 PM Unknown   N/A        I stayed in the bathroom to directly observe his urine he could not go on 5 minutes  PLAN:    Urine has buprenorphine and meth       He does have hep C,  I reviewed his CT of the chest  He has a 6.5 cm ascending thoracic aortic aneurysm  We will refer to cardiovascular surgery  We have not checked a viral load since 2017 which was positive  I told the patient that I ordered it and to get it done  Will give him Suboxone 8 mg twice daily  He said his overdose was his come to [facility] moment  Follow-up here 7/13

## 2021-07-06 NOTE — PROGRESS NOTES
Verbal order per Dr. Jose Wood for urine drug screen. Positive for BUP METH. Verified results with Susana Yan. Dr. Jose Wood ordered Suboxone 8mg film BID  for patient. Verified dose with patient. Patient was sent home with 1 week script of Suboxone 8mg film BID and will be seen back in the office 7/13/21.

## 2021-07-13 ENCOUNTER — NURSE ONLY (OUTPATIENT)
Dept: LAB | Age: 56
End: 2021-07-13

## 2021-07-13 ENCOUNTER — OFFICE VISIT (OUTPATIENT)
Dept: INTERNAL MEDICINE CLINIC | Age: 56
End: 2021-07-13
Payer: MEDICAID

## 2021-07-13 VITALS
WEIGHT: 157 LBS | HEIGHT: 67 IN | BODY MASS INDEX: 24.64 KG/M2 | DIASTOLIC BLOOD PRESSURE: 101 MMHG | SYSTOLIC BLOOD PRESSURE: 176 MMHG | TEMPERATURE: 98.3 F | HEART RATE: 110 BPM

## 2021-07-13 DIAGNOSIS — F31.70 BIPOLAR AFFECTIVE DISORDER IN REMISSION (HCC): ICD-10-CM

## 2021-07-13 DIAGNOSIS — F19.10 POLYSUBSTANCE ABUSE (HCC): ICD-10-CM

## 2021-07-13 DIAGNOSIS — F11.20 SEVERE OPIOID USE DISORDER (HCC): Primary | ICD-10-CM

## 2021-07-13 DIAGNOSIS — Z51.81 ENCOUNTER FOR MONITORING SUBOXONE MAINTENANCE THERAPY: ICD-10-CM

## 2021-07-13 DIAGNOSIS — B18.2 HEP C W/O COMA, CHRONIC (HCC): ICD-10-CM

## 2021-07-13 DIAGNOSIS — I71.20 THORACIC AORTIC ANEURYSM WITHOUT RUPTURE: ICD-10-CM

## 2021-07-13 DIAGNOSIS — F11.20 SEVERE OPIOID USE DISORDER (HCC): ICD-10-CM

## 2021-07-13 DIAGNOSIS — Z79.899 ENCOUNTER FOR MONITORING SUBOXONE MAINTENANCE THERAPY: ICD-10-CM

## 2021-07-13 PROCEDURE — G8428 CUR MEDS NOT DOCUMENT: HCPCS | Performed by: INTERNAL MEDICINE

## 2021-07-13 PROCEDURE — 99213 OFFICE O/P EST LOW 20 MIN: CPT | Performed by: INTERNAL MEDICINE

## 2021-07-13 PROCEDURE — 80305 DRUG TEST PRSMV DIR OPT OBS: CPT | Performed by: INTERNAL MEDICINE

## 2021-07-13 PROCEDURE — 3017F COLORECTAL CA SCREEN DOC REV: CPT | Performed by: INTERNAL MEDICINE

## 2021-07-13 PROCEDURE — G8420 CALC BMI NORM PARAMETERS: HCPCS | Performed by: INTERNAL MEDICINE

## 2021-07-13 PROCEDURE — 4004F PT TOBACCO SCREEN RCVD TLK: CPT | Performed by: INTERNAL MEDICINE

## 2021-07-13 RX ORDER — BUPRENORPHINE AND NALOXONE 8; 2 MG/1; MG/1
1 FILM, SOLUBLE BUCCAL; SUBLINGUAL 2 TIMES DAILY
Qty: 14 FILM | Refills: 0 | Status: SHIPPED | OUTPATIENT
Start: 2021-07-13 | End: 2021-07-20 | Stop reason: SDUPTHER
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
## 2021-07-13 NOTE — PROGRESS NOTES
Verbal order per Dr. Lakisha Mcpherson for urine drug screen. Positive for BUP METH MDMA AMP THC. Verified results with Danielle Billy. Dr. Lakisha Mcpherson ordered Suboxone 8mg film BID  for patient. Verified dose with patient. Patient was sent home with 1 week script of Suboxone 8mg film BID and will be seen back in the office 7/20/21. 18

## 2021-07-16 LAB
HCV QNT BY NAAT INTERPRETATION: DETECTED
HCV QNT BY NAAT IU/ML: ABNORMAL
HCV QNT BY NAAT LOG IU/ML: 6.67 LOG IU/ML

## 2021-07-20 ENCOUNTER — OFFICE VISIT (OUTPATIENT)
Dept: INTERNAL MEDICINE CLINIC | Age: 56
End: 2021-07-20
Payer: MEDICAID

## 2021-07-20 VITALS
HEART RATE: 103 BPM | TEMPERATURE: 97.1 F | SYSTOLIC BLOOD PRESSURE: 156 MMHG | WEIGHT: 160.6 LBS | BODY MASS INDEX: 25.15 KG/M2 | DIASTOLIC BLOOD PRESSURE: 108 MMHG

## 2021-07-20 DIAGNOSIS — Z51.81 ENCOUNTER FOR MONITORING SUBOXONE MAINTENANCE THERAPY: ICD-10-CM

## 2021-07-20 DIAGNOSIS — F11.20 SEVERE OPIOID USE DISORDER (HCC): Primary | ICD-10-CM

## 2021-07-20 DIAGNOSIS — F31.70 BIPOLAR AFFECTIVE DISORDER IN REMISSION (HCC): ICD-10-CM

## 2021-07-20 DIAGNOSIS — F19.10 POLYSUBSTANCE ABUSE (HCC): ICD-10-CM

## 2021-07-20 DIAGNOSIS — J44.9 CHRONIC OBSTRUCTIVE PULMONARY DISEASE, UNSPECIFIED COPD TYPE (HCC): ICD-10-CM

## 2021-07-20 DIAGNOSIS — I71.20 THORACIC AORTIC ANEURYSM WITHOUT RUPTURE: ICD-10-CM

## 2021-07-20 DIAGNOSIS — Z79.899 ENCOUNTER FOR MONITORING SUBOXONE MAINTENANCE THERAPY: ICD-10-CM

## 2021-07-20 DIAGNOSIS — B18.2 HEP C W/O COMA, CHRONIC (HCC): ICD-10-CM

## 2021-07-20 PROCEDURE — 4004F PT TOBACCO SCREEN RCVD TLK: CPT | Performed by: INTERNAL MEDICINE

## 2021-07-20 PROCEDURE — 99213 OFFICE O/P EST LOW 20 MIN: CPT | Performed by: INTERNAL MEDICINE

## 2021-07-20 PROCEDURE — 3023F SPIROM DOC REV: CPT | Performed by: INTERNAL MEDICINE

## 2021-07-20 PROCEDURE — 3017F COLORECTAL CA SCREEN DOC REV: CPT | Performed by: INTERNAL MEDICINE

## 2021-07-20 PROCEDURE — G8926 SPIRO NO PERF OR DOC: HCPCS | Performed by: INTERNAL MEDICINE

## 2021-07-20 PROCEDURE — G8427 DOCREV CUR MEDS BY ELIG CLIN: HCPCS | Performed by: INTERNAL MEDICINE

## 2021-07-20 PROCEDURE — G8419 CALC BMI OUT NRM PARAM NOF/U: HCPCS | Performed by: INTERNAL MEDICINE

## 2021-07-20 PROCEDURE — 80305 DRUG TEST PRSMV DIR OPT OBS: CPT | Performed by: INTERNAL MEDICINE

## 2021-07-20 RX ORDER — BUPRENORPHINE AND NALOXONE 8; 2 MG/1; MG/1
1 FILM, SOLUBLE BUCCAL; SUBLINGUAL 2 TIMES DAILY
Qty: 14 FILM | Refills: 0 | Status: SHIPPED | OUTPATIENT
Start: 2021-07-20 | End: 2021-07-27 | Stop reason: SDUPTHER
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
## 2021-07-20 NOTE — PROGRESS NOTES
Verbal order per Dr. Debra Layton for urine drug screen. Positive for BUP METH THC. Verified results with marcia CROWE LPN. Dr. Debra Layton ordered Suboxone 8mg film BID for patient. Verified dose with patient. Patient was sent home with 1 week script of Suboxone 8mg film BID and will be seen back in the office 7/27/21.

## 2021-07-27 ENCOUNTER — OFFICE VISIT (OUTPATIENT)
Dept: INTERNAL MEDICINE CLINIC | Age: 56
End: 2021-07-27
Payer: MEDICAID

## 2021-07-27 VITALS
SYSTOLIC BLOOD PRESSURE: 126 MMHG | TEMPERATURE: 98.3 F | BODY MASS INDEX: 25.11 KG/M2 | WEIGHT: 160 LBS | HEART RATE: 97 BPM | DIASTOLIC BLOOD PRESSURE: 86 MMHG | HEIGHT: 67 IN

## 2021-07-27 DIAGNOSIS — B18.2 HEP C W/O COMA, CHRONIC (HCC): ICD-10-CM

## 2021-07-27 DIAGNOSIS — Z51.81 ENCOUNTER FOR MONITORING SUBOXONE MAINTENANCE THERAPY: ICD-10-CM

## 2021-07-27 DIAGNOSIS — F11.20 SEVERE OPIOID USE DISORDER (HCC): Primary | ICD-10-CM

## 2021-07-27 DIAGNOSIS — F19.10 POLYSUBSTANCE ABUSE (HCC): ICD-10-CM

## 2021-07-27 DIAGNOSIS — I71.20 THORACIC AORTIC ANEURYSM WITHOUT RUPTURE: ICD-10-CM

## 2021-07-27 DIAGNOSIS — G40.A09 ABSENCE SEIZURE (HCC): ICD-10-CM

## 2021-07-27 DIAGNOSIS — Z79.899 ENCOUNTER FOR MONITORING SUBOXONE MAINTENANCE THERAPY: ICD-10-CM

## 2021-07-27 DIAGNOSIS — F31.70 BIPOLAR AFFECTIVE DISORDER IN REMISSION (HCC): ICD-10-CM

## 2021-07-27 PROCEDURE — 3017F COLORECTAL CA SCREEN DOC REV: CPT | Performed by: INTERNAL MEDICINE

## 2021-07-27 PROCEDURE — G8428 CUR MEDS NOT DOCUMENT: HCPCS | Performed by: INTERNAL MEDICINE

## 2021-07-27 PROCEDURE — 80305 DRUG TEST PRSMV DIR OPT OBS: CPT | Performed by: INTERNAL MEDICINE

## 2021-07-27 PROCEDURE — G8419 CALC BMI OUT NRM PARAM NOF/U: HCPCS | Performed by: INTERNAL MEDICINE

## 2021-07-27 PROCEDURE — 99213 OFFICE O/P EST LOW 20 MIN: CPT | Performed by: INTERNAL MEDICINE

## 2021-07-27 PROCEDURE — 4004F PT TOBACCO SCREEN RCVD TLK: CPT | Performed by: INTERNAL MEDICINE

## 2021-07-27 RX ORDER — BUPRENORPHINE AND NALOXONE 8; 2 MG/1; MG/1
1 FILM, SOLUBLE BUCCAL; SUBLINGUAL 2 TIMES DAILY
Qty: 14 FILM | Refills: 0 | Status: SHIPPED | OUTPATIENT
Start: 2021-07-27 | End: 2021-08-02 | Stop reason: SDUPTHER

## 2021-07-27 NOTE — PROGRESS NOTES
Verbal order per Dr. Jered Lu for urine drug screen. Positive for BUP. Verified results with Sanjiv Newton. Dr. Jered Lu ordered Suboxone 8mg film BID for patient. Verified dose with patient. Patient was sent home with 1 week script of Suboxone 8mg film BID and will be seen back in the office 8/3/21. UC and oral swab sent.

## 2021-07-27 NOTE — PROGRESS NOTES
good hygiene  Behavior/Motor:  {Normal  Attitude toward examiner:  cooperative and attentive  Speech:  spontaneous and normal volume  Mood: Upbeat  Affect:  mood congruent  Thought processes:  linear  Thought content:  Denies homicidal ideations  Suicidal Ideation:  denies suicidal ideation  Delusions:  no evidence of delusions  Perceptual Disturbance:  denies any perceptual disturbance  Cognition:  oriented to person, place, and time    Insight : poor  Judgment: poor  Medication Side Effects:none        URINE DRUG SCREEN TODAY:PLAN:    Alcohol, Urine 07/27/2021  2:13 PM Unknown   NEG    Amphetamine Screen, Urine 07/27/2021  2:13 PM Unknown   NEG    Barbiturate Screen, Urine 07/27/2021  2:13 PM Unknown   NEG    Benzodiazepine Screen, Urine 07/27/2021  2:13 PM Unknown   NEG    Buprenorphine Urine 07/27/2021  2:13 PM Unknown   POS    Cocaine Metabolite Screen, Urine 07/27/2021  2:13 PM Unknown   NEG    FENTANYL SCREEN, URINE 07/27/2021  2:13 PM Unknown   NEG    Gabapentin Screen, Urine 07/27/2021  2:13 PM Unknown   N/A    MDMA, Urine 07/27/2021  2:13 PM Unknown   NEG    Methadone Screen, Urine 07/27/2021  2:13 PM Unknown   NEG    Methamphetamine, Urine 07/27/2021  2:13 PM Unknown   NEG    Opiate Scrn, Ur 07/27/2021  2:13 PM Unknown   NEG    Oxycodone Screen, Ur 07/27/2021  2:13 PM Unknown   NEG    PCP Screen, Urine 07/27/2021  2:13 PM Unknown   NEG    Propoxyphene Screen, Urine 07/27/2021  2:13 PM Unknown   N/A    Synthetic Cannabinoids (K2) Screen, Urine 07/27/2021  2:13 PM Unknown   NEG    THC Screen, Urine 07/27/2021  2:13 PM Unknown   NEG    Tramadol Scrn, Ur 07/27/2021  2:13 PM Unknown   NEG    Tricyclic Antidepressants, Urine 07/27/2021  2:13 PM Unknown   N/A            He does have hep C,  I reviewed his CT of the chest  He has a 6.5 cm ascending thoracic aortic aneurysm  We will refer to cardiovascular surgery  We have not checked a viral load since 2017 which was positive  I told the patient that I ordered it and to get it done  urine clean today I do not believe it  I suspect he tampered with his urine  send oral swab comprehensive urine  he may need an inpatient program  I told the patient that I can only have so many patients at one time on Suboxone by federal law (275)  I told the patient I am approaching that number  If they are not compliant with treatment, provide doctored urines, etc I told the patient I may have to let them go because I want to see new patients who are committed  follow-up 8/3

## 2021-08-02 ENCOUNTER — OFFICE VISIT (OUTPATIENT)
Dept: INTERNAL MEDICINE CLINIC | Age: 56
End: 2021-08-02
Payer: MEDICAID

## 2021-08-02 VITALS
HEART RATE: 92 BPM | SYSTOLIC BLOOD PRESSURE: 142 MMHG | WEIGHT: 155 LBS | DIASTOLIC BLOOD PRESSURE: 84 MMHG | BODY MASS INDEX: 24.28 KG/M2 | TEMPERATURE: 97.6 F

## 2021-08-02 DIAGNOSIS — F11.20 SEVERE OPIOID USE DISORDER (HCC): Primary | ICD-10-CM

## 2021-08-02 DIAGNOSIS — B18.2 HEP C W/O COMA, CHRONIC (HCC): ICD-10-CM

## 2021-08-02 DIAGNOSIS — Z51.81 ENCOUNTER FOR MONITORING SUBOXONE MAINTENANCE THERAPY: ICD-10-CM

## 2021-08-02 DIAGNOSIS — F19.10 POLYSUBSTANCE ABUSE (HCC): ICD-10-CM

## 2021-08-02 DIAGNOSIS — F31.70 BIPOLAR AFFECTIVE DISORDER IN REMISSION (HCC): ICD-10-CM

## 2021-08-02 DIAGNOSIS — I71.20 THORACIC AORTIC ANEURYSM WITHOUT RUPTURE: ICD-10-CM

## 2021-08-02 DIAGNOSIS — J44.9 CHRONIC OBSTRUCTIVE PULMONARY DISEASE, UNSPECIFIED COPD TYPE (HCC): ICD-10-CM

## 2021-08-02 DIAGNOSIS — Z79.899 ENCOUNTER FOR MONITORING SUBOXONE MAINTENANCE THERAPY: ICD-10-CM

## 2021-08-02 PROCEDURE — 99213 OFFICE O/P EST LOW 20 MIN: CPT | Performed by: INTERNAL MEDICINE

## 2021-08-02 PROCEDURE — 3023F SPIROM DOC REV: CPT | Performed by: INTERNAL MEDICINE

## 2021-08-02 PROCEDURE — G8420 CALC BMI NORM PARAMETERS: HCPCS | Performed by: INTERNAL MEDICINE

## 2021-08-02 PROCEDURE — 3017F COLORECTAL CA SCREEN DOC REV: CPT | Performed by: INTERNAL MEDICINE

## 2021-08-02 PROCEDURE — 4004F PT TOBACCO SCREEN RCVD TLK: CPT | Performed by: INTERNAL MEDICINE

## 2021-08-02 PROCEDURE — G8926 SPIRO NO PERF OR DOC: HCPCS | Performed by: INTERNAL MEDICINE

## 2021-08-02 PROCEDURE — G8427 DOCREV CUR MEDS BY ELIG CLIN: HCPCS | Performed by: INTERNAL MEDICINE

## 2021-08-02 PROCEDURE — 80305 DRUG TEST PRSMV DIR OPT OBS: CPT | Performed by: INTERNAL MEDICINE

## 2021-08-02 RX ORDER — BUPRENORPHINE AND NALOXONE 8; 2 MG/1; MG/1
1 FILM, SOLUBLE BUCCAL; SUBLINGUAL 2 TIMES DAILY
Qty: 6 FILM | Refills: 0 | Status: SHIPPED | OUTPATIENT
Start: 2021-08-02 | End: 2021-08-04 | Stop reason: SDUPTHER

## 2021-08-02 NOTE — PROGRESS NOTES
Per Vo Dr. Brayan Zhang urine drug ordered and resulted. Pos for AMP,BUP, [place], mAMP, MDMA,THC, EtG. Verified with Baltazar Kelley LPN.  Dr. Brayan Zhang notified

## 2021-08-04 ENCOUNTER — OFFICE VISIT (OUTPATIENT)
Dept: INTERNAL MEDICINE CLINIC | Age: 56
End: 2021-08-04
Payer: MEDICAID

## 2021-08-04 VITALS
BODY MASS INDEX: 25.58 KG/M2 | SYSTOLIC BLOOD PRESSURE: 155 MMHG | HEART RATE: 99 BPM | TEMPERATURE: 98.1 F | DIASTOLIC BLOOD PRESSURE: 79 MMHG | WEIGHT: 163 LBS | HEIGHT: 67 IN

## 2021-08-04 DIAGNOSIS — F31.70 BIPOLAR AFFECTIVE DISORDER IN REMISSION (HCC): ICD-10-CM

## 2021-08-04 DIAGNOSIS — F11.20 SEVERE OPIOID USE DISORDER (HCC): Primary | ICD-10-CM

## 2021-08-04 DIAGNOSIS — I71.20 THORACIC AORTIC ANEURYSM WITHOUT RUPTURE: ICD-10-CM

## 2021-08-04 DIAGNOSIS — B18.2 HEP C W/O COMA, CHRONIC (HCC): ICD-10-CM

## 2021-08-04 DIAGNOSIS — Z51.81 ENCOUNTER FOR MONITORING SUBOXONE MAINTENANCE THERAPY: ICD-10-CM

## 2021-08-04 DIAGNOSIS — F19.10 POLYSUBSTANCE ABUSE (HCC): ICD-10-CM

## 2021-08-04 DIAGNOSIS — Z79.899 ENCOUNTER FOR MONITORING SUBOXONE MAINTENANCE THERAPY: ICD-10-CM

## 2021-08-04 PROCEDURE — 3017F COLORECTAL CA SCREEN DOC REV: CPT | Performed by: INTERNAL MEDICINE

## 2021-08-04 PROCEDURE — 80305 DRUG TEST PRSMV DIR OPT OBS: CPT | Performed by: INTERNAL MEDICINE

## 2021-08-04 PROCEDURE — 99213 OFFICE O/P EST LOW 20 MIN: CPT | Performed by: INTERNAL MEDICINE

## 2021-08-04 PROCEDURE — 4004F PT TOBACCO SCREEN RCVD TLK: CPT | Performed by: INTERNAL MEDICINE

## 2021-08-04 PROCEDURE — G8419 CALC BMI OUT NRM PARAM NOF/U: HCPCS | Performed by: INTERNAL MEDICINE

## 2021-08-04 PROCEDURE — G8427 DOCREV CUR MEDS BY ELIG CLIN: HCPCS | Performed by: INTERNAL MEDICINE

## 2021-08-04 RX ORDER — BUPRENORPHINE AND NALOXONE 8; 2 MG/1; MG/1
1 FILM, SOLUBLE BUCCAL; SUBLINGUAL 2 TIMES DAILY
Qty: 10 FILM | Refills: 0 | Status: SHIPPED | OUTPATIENT
Start: 2021-08-04 | End: 2021-08-09 | Stop reason: SDUPTHER

## 2021-08-04 NOTE — PROGRESS NOTES
Verbal order per Dr. Beny Goldstein for urine drug screen. Positive for BUP AMP METH THC. Verified results with Vlad Nelson. Dr. Beny Goldstein ordered Suboxone 8mg film BID for patient. Verified dose with patient. Patient was sent home with 5 day script of Suboxone 8mg film BID and will be seen back in the office 8/9/21. UC and oral swab sent.
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
## 2021-08-09 ENCOUNTER — OFFICE VISIT (OUTPATIENT)
Dept: INTERNAL MEDICINE CLINIC | Age: 56
End: 2021-08-09
Payer: MEDICAID

## 2021-08-09 VITALS
SYSTOLIC BLOOD PRESSURE: 169 MMHG | HEIGHT: 67 IN | WEIGHT: 158 LBS | TEMPERATURE: 97.4 F | HEART RATE: 83 BPM | BODY MASS INDEX: 24.8 KG/M2 | DIASTOLIC BLOOD PRESSURE: 78 MMHG

## 2021-08-09 DIAGNOSIS — F11.20 SEVERE OPIOID USE DISORDER (HCC): Primary | ICD-10-CM

## 2021-08-09 DIAGNOSIS — F31.70 BIPOLAR AFFECTIVE DISORDER IN REMISSION (HCC): ICD-10-CM

## 2021-08-09 DIAGNOSIS — F19.10 POLYSUBSTANCE ABUSE (HCC): ICD-10-CM

## 2021-08-09 DIAGNOSIS — Z51.81 ENCOUNTER FOR MONITORING SUBOXONE MAINTENANCE THERAPY: ICD-10-CM

## 2021-08-09 DIAGNOSIS — I71.20 THORACIC AORTIC ANEURYSM WITHOUT RUPTURE: ICD-10-CM

## 2021-08-09 DIAGNOSIS — B18.2 HEP C W/O COMA, CHRONIC (HCC): ICD-10-CM

## 2021-08-09 DIAGNOSIS — Z79.899 ENCOUNTER FOR MONITORING SUBOXONE MAINTENANCE THERAPY: ICD-10-CM

## 2021-08-09 PROCEDURE — 99213 OFFICE O/P EST LOW 20 MIN: CPT | Performed by: INTERNAL MEDICINE

## 2021-08-09 PROCEDURE — 4004F PT TOBACCO SCREEN RCVD TLK: CPT | Performed by: INTERNAL MEDICINE

## 2021-08-09 PROCEDURE — G8428 CUR MEDS NOT DOCUMENT: HCPCS | Performed by: INTERNAL MEDICINE

## 2021-08-09 PROCEDURE — 3017F COLORECTAL CA SCREEN DOC REV: CPT | Performed by: INTERNAL MEDICINE

## 2021-08-09 PROCEDURE — 80305 DRUG TEST PRSMV DIR OPT OBS: CPT | Performed by: INTERNAL MEDICINE

## 2021-08-09 PROCEDURE — G8420 CALC BMI NORM PARAMETERS: HCPCS | Performed by: INTERNAL MEDICINE

## 2021-08-09 RX ORDER — BUPRENORPHINE AND NALOXONE 8; 2 MG/1; MG/1
1 FILM, SOLUBLE BUCCAL; SUBLINGUAL 2 TIMES DAILY
Qty: 14 FILM | Refills: 0 | Status: SHIPPED | OUTPATIENT
Start: 2021-08-09 | End: 2021-08-16

## 2021-08-09 NOTE — PROGRESS NOTES
and good hygiene  Behavior/Motor:  {Normal  Attitude toward examiner:  cooperative and attentive  Speech:  spontaneous and normal volume  Mood: Upbeat  Affect:  mood congruent  Thought processes:  linear  Thought content:  Denies homicidal ideations  Suicidal Ideation:  denies suicidal ideation  Delusions:  no evidence of delusions  Perceptual Disturbance:  denies any perceptual disturbance  Cognition:  oriented to person, place, and time    Insight : poor  Judgment: poor  Medication Side Effects:none        URINE DRUG SCREEN TODAY  Alcohol, Urine 08/09/2021  4:21 PM Unknown   NEG    Amphetamine Screen, Urine 08/09/2021  4:21 PM Unknown   NEG    Barbiturate Screen, Urine 08/09/2021  4:21 PM Unknown   NEG    Benzodiazepine Screen, Urine 08/09/2021  4:21 PM Unknown   NEG    Buprenorphine Urine 08/09/2021  4:21 PM Unknown   POS    Cocaine Metabolite Screen, Urine 08/09/2021  4:21 PM Unknown   NEG    FENTANYL SCREEN, URINE 08/09/2021  4:21 PM Unknown   NEG    Gabapentin Screen, Urine 08/09/2021  4:21 PM Unknown   N/A    MDMA, Urine 08/09/2021  4:21 PM Unknown   NEG    Methadone Screen, Urine 08/09/2021  4:21 PM Unknown   NEG    Methamphetamine, Urine 08/09/2021  4:21 PM Unknown   POS    Opiate Scrn, Ur 08/09/2021  4:21 PM Unknown   NEG    Oxycodone Screen, Ur 08/09/2021  4:21 PM Unknown   NEG    PCP Screen, Urine 08/09/2021  4:21 PM Unknown   NEG    Propoxyphene Screen, Urine 08/09/2021  4:21 PM Unknown   N/A    Synthetic Cannabinoids (K2) Screen, Urine 08/09/2021  4:21 PM Unknown   NEG    THC Screen, Urine 08/09/2021  4:21 PM Unknown   POS    Tramadol Scrn, Ur 08/09/2021  4:21 PM Unknown   NEG    Tricyclic Antidepressants, Urine 08/09/2021  4:21 PM Unknown   N/A    Lab and Collection        :PLAN:  He does have hep C,  I reviewed his CT of the chest  He has a 6.5 cm ascending thoracic aortic aneurysm  I have referred him several times to Dr. Vikas Cabello from cardiovascular surgery  I told her that it is imperative that he get a surgical opinion, I doubt that he will  He says he has an appointment  Urine shows meth  He says he is honest with the all the time but he always provides tampered with urine  We will do comprehensive urine oral swab  I really think he needs an inpatient program to get clean  I told the patient that I can only have so many patients at one time on Suboxone by federal law (275)  I told the patient I am approaching that number  If they are not compliant with treatment, provide doctored urines, etc I told the patient I may have to let them go because I want to see new patients who are committed  follow-up 8/16

## 2021-08-09 NOTE — PROGRESS NOTES
Verbal order per Dr. Kelli Johns for urine drug screen. Positive for BUP METH THC. Verified results with [facility] President. Dr. Kelli Johns ordered Suboxone 8mg film BID for patient. Verified dose with patient. Patient was sent home with 1 week script of Suboxonbe 8mg film BID and will be seen back in the office 8/16/21. UC and oral swab sent.

## 2021-08-17 ENCOUNTER — CLINICAL DOCUMENTATION (OUTPATIENT)
Dept: INTERNAL MEDICINE CLINIC | Age: 56
End: 2021-08-17

## 2021-08-17 ENCOUNTER — OFFICE VISIT (OUTPATIENT)
Dept: INTERNAL MEDICINE CLINIC | Age: 56
End: 2021-08-17
Payer: MEDICAID

## 2021-08-17 VITALS
WEIGHT: 162 LBS | BODY MASS INDEX: 25.43 KG/M2 | TEMPERATURE: 96.8 F | HEART RATE: 90 BPM | SYSTOLIC BLOOD PRESSURE: 178 MMHG | HEIGHT: 67 IN | DIASTOLIC BLOOD PRESSURE: 96 MMHG

## 2021-08-17 DIAGNOSIS — B18.2 HEP C W/O COMA, CHRONIC (HCC): ICD-10-CM

## 2021-08-17 DIAGNOSIS — F31.70 BIPOLAR AFFECTIVE DISORDER IN REMISSION (HCC): ICD-10-CM

## 2021-08-17 DIAGNOSIS — Z79.899 ENCOUNTER FOR MONITORING SUBOXONE MAINTENANCE THERAPY: ICD-10-CM

## 2021-08-17 DIAGNOSIS — F19.10 POLYSUBSTANCE ABUSE (HCC): ICD-10-CM

## 2021-08-17 DIAGNOSIS — F11.20 SEVERE OPIOID USE DISORDER (HCC): Primary | ICD-10-CM

## 2021-08-17 DIAGNOSIS — I71.20 THORACIC AORTIC ANEURYSM WITHOUT RUPTURE: ICD-10-CM

## 2021-08-17 DIAGNOSIS — Z51.81 ENCOUNTER FOR MONITORING SUBOXONE MAINTENANCE THERAPY: ICD-10-CM

## 2021-08-17 PROCEDURE — 99213 OFFICE O/P EST LOW 20 MIN: CPT | Performed by: INTERNAL MEDICINE

## 2021-08-17 PROCEDURE — G8419 CALC BMI OUT NRM PARAM NOF/U: HCPCS | Performed by: INTERNAL MEDICINE

## 2021-08-17 PROCEDURE — G8428 CUR MEDS NOT DOCUMENT: HCPCS | Performed by: INTERNAL MEDICINE

## 2021-08-17 PROCEDURE — 3017F COLORECTAL CA SCREEN DOC REV: CPT | Performed by: INTERNAL MEDICINE

## 2021-08-17 PROCEDURE — 80305 DRUG TEST PRSMV DIR OPT OBS: CPT | Performed by: INTERNAL MEDICINE

## 2021-08-17 PROCEDURE — 4004F PT TOBACCO SCREEN RCVD TLK: CPT | Performed by: INTERNAL MEDICINE

## 2021-08-17 RX ORDER — BUPRENORPHINE AND NALOXONE 8; 2 MG/1; MG/1
1 FILM, SOLUBLE BUCCAL; SUBLINGUAL 2 TIMES DAILY
COMMUNITY
End: 2021-08-17 | Stop reason: SDUPTHER

## 2021-08-17 RX ORDER — BUPRENORPHINE AND NALOXONE 8; 2 MG/1; MG/1
1 FILM, SOLUBLE BUCCAL; SUBLINGUAL 2 TIMES DAILY
Qty: 14 FILM | Refills: 0 | Status: SHIPPED | OUTPATIENT
Start: 2021-08-17 | End: 2021-08-24

## 2021-08-17 NOTE — PROGRESS NOTES
Verbal order per Dr. Tiburcio Hurst for urine drug screen. Positive for BUP AMP METH THC . Verified results with Dru Romo. Dr. Tiburcio Hurst ordered Suboxone 8mg film BID for patient. Verified dose with patient. Patient was sent home with 1 week script of Suboxone 8mg film BID and will be seen back in the office 8/24/21.

## 2021-08-17 NOTE — PROGRESS NOTES
It was reported to Sw that Reyes ([facility]) was able to make phone contact with person and complete referral with patient to be signed up for services. Sw encouraged Robin Shari to speak to patient about getting rescheduled for the appointment he missed yesterday.
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
## 2021-08-23 ENCOUNTER — OFFICE VISIT (OUTPATIENT)
Dept: CARDIOTHORACIC SURGERY | Age: 56
End: 2021-08-23

## 2021-08-23 VITALS
SYSTOLIC BLOOD PRESSURE: 165 MMHG | BODY MASS INDEX: 25.74 KG/M2 | HEIGHT: 67 IN | DIASTOLIC BLOOD PRESSURE: 92 MMHG | WEIGHT: 164 LBS | HEART RATE: 78 BPM

## 2021-08-23 DIAGNOSIS — I71.20 THORACIC AORTIC ANEURYSM WITHOUT RUPTURE: Primary | ICD-10-CM

## 2021-08-23 PROCEDURE — 99999 PR OFFICE/OUTPT VISIT,PROCEDURE ONLY: CPT | Performed by: THORACIC SURGERY (CARDIOTHORACIC VASCULAR SURGERY)

## 2021-08-23 NOTE — PROGRESS NOTES
Per Dr Tatyana Fowler 82, appointment not necessary for patient.  He is following with [facility]. Appt cancelled per his request.

## 2021-08-25 ENCOUNTER — OFFICE VISIT (OUTPATIENT)
Dept: INTERNAL MEDICINE CLINIC | Age: 56
End: 2021-08-25
Payer: MEDICAID

## 2021-08-25 VITALS
WEIGHT: 163 LBS | TEMPERATURE: 97.9 F | HEIGHT: 67 IN | HEART RATE: 101 BPM | DIASTOLIC BLOOD PRESSURE: 106 MMHG | BODY MASS INDEX: 25.58 KG/M2 | SYSTOLIC BLOOD PRESSURE: 185 MMHG

## 2021-08-25 DIAGNOSIS — Z79.899 ENCOUNTER FOR MONITORING SUBOXONE MAINTENANCE THERAPY: ICD-10-CM

## 2021-08-25 DIAGNOSIS — F11.20 SEVERE OPIOID USE DISORDER (HCC): ICD-10-CM

## 2021-08-25 DIAGNOSIS — Z51.81 ENCOUNTER FOR MONITORING SUBOXONE MAINTENANCE THERAPY: ICD-10-CM

## 2021-08-25 DIAGNOSIS — I16.0 HYPERTENSIVE URGENCY: Primary | ICD-10-CM

## 2021-08-25 DIAGNOSIS — F19.10 POLYSUBSTANCE ABUSE (HCC): ICD-10-CM

## 2021-08-25 DIAGNOSIS — F31.70 BIPOLAR AFFECTIVE DISORDER IN REMISSION (HCC): ICD-10-CM

## 2021-08-25 DIAGNOSIS — I71.20 THORACIC AORTIC ANEURYSM WITHOUT RUPTURE: ICD-10-CM

## 2021-08-25 DIAGNOSIS — B18.2 HEP C W/O COMA, CHRONIC (HCC): ICD-10-CM

## 2021-08-25 PROCEDURE — 4004F PT TOBACCO SCREEN RCVD TLK: CPT | Performed by: INTERNAL MEDICINE

## 2021-08-25 PROCEDURE — G8419 CALC BMI OUT NRM PARAM NOF/U: HCPCS | Performed by: INTERNAL MEDICINE

## 2021-08-25 PROCEDURE — 3017F COLORECTAL CA SCREEN DOC REV: CPT | Performed by: INTERNAL MEDICINE

## 2021-08-25 PROCEDURE — 80305 DRUG TEST PRSMV DIR OPT OBS: CPT | Performed by: INTERNAL MEDICINE

## 2021-08-25 PROCEDURE — 99214 OFFICE O/P EST MOD 30 MIN: CPT | Performed by: INTERNAL MEDICINE

## 2021-08-25 PROCEDURE — G8428 CUR MEDS NOT DOCUMENT: HCPCS | Performed by: INTERNAL MEDICINE

## 2021-08-25 RX ORDER — BUPRENORPHINE AND NALOXONE 8; 2 MG/1; MG/1
1 FILM, SOLUBLE BUCCAL; SUBLINGUAL 2 TIMES DAILY
Qty: 14 FILM | Refills: 0 | Status: SHIPPED | OUTPATIENT
Start: 2021-08-25 | End: 2021-09-01
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
## 2021-08-25 NOTE — PROGRESS NOTES
Verbal order per Dr. Jaziel Contreras for urine drug screen. Positive for BUP. Verified results with Gracie Garcia. Dr. Jaziel Contreras ordered Suboxone 8mg film BID for patient. Verified dose with patient. Patient was sent home with 1 week script of Suboxone 8mg film BID and will be seen back in the office 9/1/21.

## 2021-09-10 ENCOUNTER — OFFICE VISIT (OUTPATIENT)
Dept: INTERNAL MEDICINE CLINIC | Age: 56
End: 2021-09-10
Payer: MEDICAID

## 2021-09-10 VITALS
HEIGHT: 67 IN | SYSTOLIC BLOOD PRESSURE: 154 MMHG | TEMPERATURE: 97 F | DIASTOLIC BLOOD PRESSURE: 85 MMHG | BODY MASS INDEX: 25.27 KG/M2 | HEART RATE: 85 BPM | WEIGHT: 161 LBS

## 2021-09-10 DIAGNOSIS — F11.20 SEVERE OPIOID USE DISORDER (HCC): Primary | ICD-10-CM

## 2021-09-10 PROCEDURE — G8419 CALC BMI OUT NRM PARAM NOF/U: HCPCS | Performed by: NURSE PRACTITIONER

## 2021-09-10 PROCEDURE — 99213 OFFICE O/P EST LOW 20 MIN: CPT | Performed by: NURSE PRACTITIONER

## 2021-09-10 PROCEDURE — 3017F COLORECTAL CA SCREEN DOC REV: CPT | Performed by: NURSE PRACTITIONER

## 2021-09-10 PROCEDURE — 4004F PT TOBACCO SCREEN RCVD TLK: CPT | Performed by: NURSE PRACTITIONER

## 2021-09-10 PROCEDURE — G8427 DOCREV CUR MEDS BY ELIG CLIN: HCPCS | Performed by: NURSE PRACTITIONER

## 2021-09-10 PROCEDURE — 80305 DRUG TEST PRSMV DIR OPT OBS: CPT | Performed by: NURSE PRACTITIONER

## 2021-09-10 RX ORDER — BUPRENORPHINE AND NALOXONE 8; 2 MG/1; MG/1
1 FILM, SOLUBLE BUCCAL; SUBLINGUAL 2 TIMES DAILY
Qty: 10 FILM | Refills: 0 | Status: SHIPPED | OUTPATIENT
Start: 2021-09-10 | End: 2021-09-15 | Stop reason: SDUPTHER

## 2021-09-10 NOTE — PROGRESS NOTES
Verbal order per Donna Royal CNP for urine drug screen. Negative for all drugs. Verified results with Nicolasa West.  oral swab sent.

## 2021-09-10 NOTE — PROGRESS NOTES
[facility]  [address]  Dept: [phone]  Dept Fax: [phone]: [phone]     Visit Date:  9/10/2021    Patient:  Tom Ellis  YOB: 1965    HPI:     Chief Complaint   Patient presents with    Drug Problem     Deborah Riedel presents today for medical evaluation of severe opioid use disorder    Dr. Jayesh Pearce last seen him 2 weeks ago    Missed appointment Wed    Urine negative for all substances- he states that he has been taking suboxone and smoking marijuana. Will send for comprehensive analysis. Maintained with Suboxone 8 mg BID    Sent to Dr. Madeline Babin- thoracic aortic aneurysm       Medications    Current Outpatient Medications:     buprenorphine-naloxone (SUBOXONE) 8-2 MG FILM SL film, Place 1 Film under the tongue 2 times daily for 9 days. , Disp: 18 Film, Rfl: 0    NARCAN 4 MG/0.1ML LIQD nasal spray, ADMINISTER A SINGLE SPRAY INTRANASALLY INTO ONE NOSTRIL. CALL 911. MAY REPEAT X 1., Disp: , Rfl:     The patient has No Known Allergies. Past Medical History  Deborah Riedel  has a past medical history of Allergic rhinitis, Bipolar disorder ([address] Chronic back pain, COPD (chronic obstructive pulmonary disease) ([address] Depression, GERD (gastroesophageal reflux disease), Hypertension, Liver disease, Seizures ([address] Substance abuse ([address] TBI (traumatic brain injury) ([address] and Tic. Past Surgical History  The patient  has a past surgical history that includes Facial cosmetic surgery (12/2005). Family History  This patient's family history includes Alcohol Abuse in his father and maternal grandfather; Allergy (Severe) in his mother; Anemia in his mother; Arrhythmia in his mother; Arthritis in his mother; Asthma in his mother; Breast Cancer in his paternal aunt;  Cancer in his maternal aunt, maternal uncle, and paternal aunt; Coronary Art Dis in his mother; Depression in his mother; Diabetes in his mother; Early Death in his father; Heart Attack in his maternal aunt, maternal grandfather, maternal grandmother, maternal uncle, paternal aunt, paternal grandfather, paternal grandmother, and paternal uncle; Heart Disease in his maternal aunt, maternal grandfather, maternal grandmother, maternal uncle, paternal aunt, paternal grandfather, paternal grandmother, and paternal uncle; High Blood Pressure in his father, maternal aunt, maternal uncle, mother, paternal aunt, and paternal uncle; High Cholesterol in his father and mother; Learning Disabilities in his maternal aunt, maternal cousin, maternal uncle, paternal aunt, paternal cousin, and paternal uncle; Mental Illness in his maternal cousin; Mental Retardation in his maternal cousin; Anshul Pummel / Geroldine Morales in his mother; Substance Abuse in his paternal grandfather. Social History  Zander Krishnan  reports that he has been smoking cigarettes. He started smoking about 43 years ago. He has a 17.50 pack-year smoking history. He has never used smokeless tobacco. He reports current alcohol use. He reports current drug use. Frequency: 2.00 times per week. Drugs: Marijuana, Opiates , and Methamphetamines. Health Maintenance:    Health Maintenance   Topic Date Due    Hepatitis A vaccine (1 of 2 - Risk 2-dose series) Never done    Pneumococcal 0-64 years Vaccine (1 of 2 - PPSV23) Never done    COVID-19 Vaccine (1) Never done    Hepatitis B vaccine (1 of 3 - Risk 3-dose series) Never done    DTaP/Tdap/Td vaccine (1 - Tdap) Never done    Diabetes screen  Never done    Shingles Vaccine (1 of 2) Never done    Flu vaccine (1) Never done    Lipid screen  12/17/2023    Colon cancer screen colonoscopy  03/26/2028    HIV screen  Completed    Hib vaccine  Aged Out    Meningococcal (ACWY) vaccine  Aged Out       Subjective:      Review of Systems   All other systems reviewed and are negative.       Objective:     BP (!) 154/85 (Site: Right Lower Arm, Position: Sitting, Cuff Size: Medium Adult)   Pulse 85   Temp 97 °F (36.1 °C)   Ht 5' 7\" (1.702 m)   Wt 161 lb (73 kg)   BMI 25.22 kg/m²     Physical Exam  Vitals reviewed. Constitutional:       General: He is not in acute distress. Appearance: Normal appearance. He is not ill-appearing. HENT:      Head: Normocephalic and atraumatic. Right Ear: External ear normal.      Left Ear: External ear normal.      Nose: Nose normal. No congestion or rhinorrhea. Mouth/Throat:      Mouth: Mucous membranes are moist.   Eyes:      Extraocular Movements: Extraocular movements intact. Conjunctiva/sclera: Conjunctivae normal.      Pupils: Pupils are equal, round, and reactive to light. Pulmonary:      Effort: Pulmonary effort is normal. No respiratory distress. Musculoskeletal:         General: Normal range of motion. Cervical back: Normal range of motion and neck supple. Right lower leg: No edema. Left lower leg: No edema. Skin:     General: Skin is warm and dry. Neurological:      General: No focal deficit present. Mental Status: He is alert and oriented to person, place, and time. Psychiatric:         Mood and Affect: Mood normal.         Behavior: Behavior normal.         Thought Content: Thought content normal.         Judgment: Judgment normal.         Labs Reviewed 9/10/2021:    Lab Results   Component Value Date    WBC 9.8 12/17/2018    HGB 15.4 12/17/2018    HCT 50.0 12/17/2018     12/17/2018    CHOL 173 12/17/2018    TRIG 117 12/17/2018    HDL 54 12/17/2018    LDLDIRECT 165 (H) 05/24/2017    ALT 37 12/17/2018    AST 29 12/17/2018     12/17/2018    K 4.1 12/17/2018    CL 94 (L) 12/17/2018    CREATININE 0.77 12/17/2018    BUN 13 12/17/2018    CO2 31 12/17/2018    TSH 1.52 12/17/2018    PSA 0.46 12/17/2018    INR 0.9 12/17/2018       Assessment/Plan      1.  Severe opioid use disorder (HCC)    - POCT Rapid Drug Screen  - Continue Suboxone 8 mg BID  -

## 2021-09-15 ENCOUNTER — OFFICE VISIT (OUTPATIENT)
Dept: INTERNAL MEDICINE CLINIC | Age: 56
End: 2021-09-15
Payer: MEDICAID

## 2021-09-15 VITALS
WEIGHT: 164 LBS | DIASTOLIC BLOOD PRESSURE: 92 MMHG | TEMPERATURE: 98.1 F | HEART RATE: 90 BPM | HEIGHT: 67 IN | SYSTOLIC BLOOD PRESSURE: 134 MMHG | BODY MASS INDEX: 25.74 KG/M2

## 2021-09-15 DIAGNOSIS — J44.9 CHRONIC OBSTRUCTIVE PULMONARY DISEASE, UNSPECIFIED COPD TYPE (HCC): ICD-10-CM

## 2021-09-15 DIAGNOSIS — I10 ESSENTIAL HYPERTENSION: ICD-10-CM

## 2021-09-15 DIAGNOSIS — F19.10 POLYSUBSTANCE ABUSE (HCC): ICD-10-CM

## 2021-09-15 DIAGNOSIS — B18.2 HEP C W/O COMA, CHRONIC (HCC): ICD-10-CM

## 2021-09-15 DIAGNOSIS — F11.20 SEVERE OPIOID USE DISORDER (HCC): Primary | ICD-10-CM

## 2021-09-15 DIAGNOSIS — Z51.81 ENCOUNTER FOR MONITORING SUBOXONE MAINTENANCE THERAPY: ICD-10-CM

## 2021-09-15 DIAGNOSIS — F31.70 BIPOLAR AFFECTIVE DISORDER IN REMISSION (HCC): ICD-10-CM

## 2021-09-15 DIAGNOSIS — I71.20 THORACIC AORTIC ANEURYSM WITHOUT RUPTURE: ICD-10-CM

## 2021-09-15 DIAGNOSIS — Z79.899 ENCOUNTER FOR MONITORING SUBOXONE MAINTENANCE THERAPY: ICD-10-CM

## 2021-09-15 PROCEDURE — 3023F SPIROM DOC REV: CPT | Performed by: INTERNAL MEDICINE

## 2021-09-15 PROCEDURE — G8419 CALC BMI OUT NRM PARAM NOF/U: HCPCS | Performed by: INTERNAL MEDICINE

## 2021-09-15 PROCEDURE — 99213 OFFICE O/P EST LOW 20 MIN: CPT | Performed by: INTERNAL MEDICINE

## 2021-09-15 PROCEDURE — 80305 DRUG TEST PRSMV DIR OPT OBS: CPT | Performed by: INTERNAL MEDICINE

## 2021-09-15 PROCEDURE — 3017F COLORECTAL CA SCREEN DOC REV: CPT | Performed by: INTERNAL MEDICINE

## 2021-09-15 PROCEDURE — 4004F PT TOBACCO SCREEN RCVD TLK: CPT | Performed by: INTERNAL MEDICINE

## 2021-09-15 PROCEDURE — G8926 SPIRO NO PERF OR DOC: HCPCS | Performed by: INTERNAL MEDICINE

## 2021-09-15 PROCEDURE — G8428 CUR MEDS NOT DOCUMENT: HCPCS | Performed by: INTERNAL MEDICINE

## 2021-09-15 RX ORDER — BUPRENORPHINE AND NALOXONE 8; 2 MG/1; MG/1
1 FILM, SOLUBLE BUCCAL; SUBLINGUAL 2 TIMES DAILY
Qty: 14 FILM | Refills: 0 | Status: SHIPPED | OUTPATIENT
Start: 2021-09-15 | End: 2021-09-21 | Stop reason: SDUPTHER

## 2021-09-15 NOTE — PROGRESS NOTES
Verbal order per Dr. Scott Girard for urine drug screen. Negative for all drugs. Verified results with Landy Hemp. Dr. Scott Girard ordered Suboxone 8mg film BID for patient. Verified dose with patient. Patient was sent home with 1 week script of Suboxone 8mg film BID and will be seen back in the office 9/22/21. UC and oral swab sent.

## 2021-09-21 ENCOUNTER — OFFICE VISIT (OUTPATIENT)
Dept: INTERNAL MEDICINE CLINIC | Age: 56
End: 2021-09-21
Payer: MEDICAID

## 2021-09-21 VITALS
WEIGHT: 165 LBS | BODY MASS INDEX: 25.9 KG/M2 | TEMPERATURE: 98 F | DIASTOLIC BLOOD PRESSURE: 78 MMHG | HEART RATE: 93 BPM | HEIGHT: 67 IN | SYSTOLIC BLOOD PRESSURE: 162 MMHG

## 2021-09-21 DIAGNOSIS — F31.70 BIPOLAR AFFECTIVE DISORDER IN REMISSION (HCC): ICD-10-CM

## 2021-09-21 DIAGNOSIS — I10 ESSENTIAL HYPERTENSION: ICD-10-CM

## 2021-09-21 DIAGNOSIS — Z79.899 ENCOUNTER FOR MONITORING SUBOXONE MAINTENANCE THERAPY: ICD-10-CM

## 2021-09-21 DIAGNOSIS — Z51.81 ENCOUNTER FOR MONITORING SUBOXONE MAINTENANCE THERAPY: ICD-10-CM

## 2021-09-21 DIAGNOSIS — F19.10 POLYSUBSTANCE ABUSE (HCC): ICD-10-CM

## 2021-09-21 DIAGNOSIS — F11.20 SEVERE OPIOID USE DISORDER (HCC): Primary | ICD-10-CM

## 2021-09-21 DIAGNOSIS — I71.20 THORACIC AORTIC ANEURYSM WITHOUT RUPTURE: ICD-10-CM

## 2021-09-21 DIAGNOSIS — B18.2 HEP C W/O COMA, CHRONIC (HCC): ICD-10-CM

## 2021-09-21 PROCEDURE — G8419 CALC BMI OUT NRM PARAM NOF/U: HCPCS | Performed by: INTERNAL MEDICINE

## 2021-09-21 PROCEDURE — 99213 OFFICE O/P EST LOW 20 MIN: CPT | Performed by: INTERNAL MEDICINE

## 2021-09-21 PROCEDURE — 3017F COLORECTAL CA SCREEN DOC REV: CPT | Performed by: INTERNAL MEDICINE

## 2021-09-21 PROCEDURE — 80305 DRUG TEST PRSMV DIR OPT OBS: CPT | Performed by: INTERNAL MEDICINE

## 2021-09-21 PROCEDURE — 4004F PT TOBACCO SCREEN RCVD TLK: CPT | Performed by: INTERNAL MEDICINE

## 2021-09-21 PROCEDURE — G8427 DOCREV CUR MEDS BY ELIG CLIN: HCPCS | Performed by: INTERNAL MEDICINE

## 2021-09-21 RX ORDER — BUPRENORPHINE AND NALOXONE 8; 2 MG/1; MG/1
1 FILM, SOLUBLE BUCCAL; SUBLINGUAL 2 TIMES DAILY
Qty: 14 FILM | Refills: 0 | Status: SHIPPED | OUTPATIENT
Start: 2021-09-21 | End: 2021-09-28 | Stop reason: SDUPTHER

## 2021-09-21 NOTE — PROGRESS NOTES
Verbal order per Dr. Scott Girard for urine drug screen. Negative for all drugs. Verified results with Landy Hemp. Dr. Scott Girard ordered Suboxone 8mg film BID for patient. Verified dose with patient. Patient was sent home with1 week script of Suboxone 8mg film BID and will be seen back in the office 9/28/21. UC sent.
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
## 2021-09-28 DIAGNOSIS — F11.20 SEVERE OPIOID USE DISORDER (HCC): ICD-10-CM

## 2021-09-28 RX ORDER — BUPRENORPHINE AND NALOXONE 8; 2 MG/1; MG/1
1 FILM, SOLUBLE BUCCAL; SUBLINGUAL 2 TIMES DAILY
Qty: 18 FILM | Refills: 0 | OUTPATIENT
Start: 2021-09-28 | End: 2021-10-06 | Stop reason: SDUPTHER

## 2021-09-28 NOTE — TELEPHONE ENCOUNTER
VO per Dr. Sheriff Cleveland for Suboxone 8mg film BID for 9 days qty 18. LPN called in script of Suboxone 8mg film BID for 9 days qty 18 into [facility] on [address].

## 2021-10-06 ENCOUNTER — OFFICE VISIT (OUTPATIENT)
Dept: INTERNAL MEDICINE CLINIC | Age: 56
End: 2021-10-06
Payer: MEDICAID

## 2021-10-06 VITALS
WEIGHT: 163 LBS | HEIGHT: 67 IN | SYSTOLIC BLOOD PRESSURE: 125 MMHG | DIASTOLIC BLOOD PRESSURE: 82 MMHG | TEMPERATURE: 97.7 F | BODY MASS INDEX: 25.58 KG/M2 | HEART RATE: 117 BPM

## 2021-10-06 DIAGNOSIS — B18.2 HEP C W/O COMA, CHRONIC (HCC): ICD-10-CM

## 2021-10-06 DIAGNOSIS — J44.9 CHRONIC OBSTRUCTIVE PULMONARY DISEASE, UNSPECIFIED COPD TYPE (HCC): ICD-10-CM

## 2021-10-06 DIAGNOSIS — Z79.899 ENCOUNTER FOR MONITORING SUBOXONE MAINTENANCE THERAPY: ICD-10-CM

## 2021-10-06 DIAGNOSIS — Z51.81 ENCOUNTER FOR MONITORING SUBOXONE MAINTENANCE THERAPY: ICD-10-CM

## 2021-10-06 DIAGNOSIS — I71.20 THORACIC AORTIC ANEURYSM WITHOUT RUPTURE: ICD-10-CM

## 2021-10-06 DIAGNOSIS — F11.20 SEVERE OPIOID USE DISORDER (HCC): Primary | ICD-10-CM

## 2021-10-06 DIAGNOSIS — F19.10 POLYSUBSTANCE ABUSE (HCC): ICD-10-CM

## 2021-10-06 DIAGNOSIS — I10 ESSENTIAL HYPERTENSION: ICD-10-CM

## 2021-10-06 DIAGNOSIS — F31.70 BIPOLAR AFFECTIVE DISORDER IN REMISSION (HCC): ICD-10-CM

## 2021-10-06 PROCEDURE — G8484 FLU IMMUNIZE NO ADMIN: HCPCS | Performed by: INTERNAL MEDICINE

## 2021-10-06 PROCEDURE — 99213 OFFICE O/P EST LOW 20 MIN: CPT | Performed by: INTERNAL MEDICINE

## 2021-10-06 PROCEDURE — 4004F PT TOBACCO SCREEN RCVD TLK: CPT | Performed by: INTERNAL MEDICINE

## 2021-10-06 PROCEDURE — 3023F SPIROM DOC REV: CPT | Performed by: INTERNAL MEDICINE

## 2021-10-06 PROCEDURE — 3017F COLORECTAL CA SCREEN DOC REV: CPT | Performed by: INTERNAL MEDICINE

## 2021-10-06 PROCEDURE — 80305 DRUG TEST PRSMV DIR OPT OBS: CPT | Performed by: INTERNAL MEDICINE

## 2021-10-06 PROCEDURE — G8419 CALC BMI OUT NRM PARAM NOF/U: HCPCS | Performed by: INTERNAL MEDICINE

## 2021-10-06 PROCEDURE — G8926 SPIRO NO PERF OR DOC: HCPCS | Performed by: INTERNAL MEDICINE

## 2021-10-06 PROCEDURE — G8428 CUR MEDS NOT DOCUMENT: HCPCS | Performed by: INTERNAL MEDICINE

## 2021-10-06 RX ORDER — BUPRENORPHINE AND NALOXONE 8; 2 MG/1; MG/1
1 FILM, SOLUBLE BUCCAL; SUBLINGUAL 2 TIMES DAILY
Qty: 14 FILM | Refills: 0 | Status: SHIPPED | OUTPATIENT
Start: 2021-10-06 | End: 2021-10-13 | Stop reason: SDUPTHER

## 2021-10-06 NOTE — PROGRESS NOTES
Verbal order per Dr. Dee Dee Gifford for urine drug screen. Positive for BUP THC. Verified results with Thomas Mckoy. Dr. Dee Dee Gifford ordered Suboxone 8mg film BID  for patient. Verified dose with patient. Patient was sent home with 1 week script of Suboxone 8mg film BID and will be seen back in the office 10/13/21.

## 2021-10-06 NOTE — PROGRESS NOTES
MEDICATION ASSISTED TREATMENT ENCOUNTER    HISTORY OF PRESENT ILLNESS  Patient presents for evaluation of opioid use and would like to be placed on medication assisted treatment  I saw the patient here  6/29 for the first time since March, last time here 9/21      I put him back on Suboxone  He is here with his girlfriend  A couple months ago he said he relapsed on what he thought was ice but it made him go to sleep  He is actually been doing this for the last couple of months  He said the rescue squad had to Narcan him 3 times  He said he was admitted for a day to [facility] but signed himself out the next day  They told him he had a heart issue, they did an echocardiogram  He is back with his girlfriend she had kicked him out because he was using he was on the street for couple of months    He saw Dr. Jyothi Madsen from cardiovascular surgery  He said basically he told him since he had his testing done at [facility] he should see someone there    He says he has not relapsed     Past Medical History:   Diagnosis Date    Allergic rhinitis     Bipolar disorder ([address]     Chronic back pain     COPD (chronic obstructive pulmonary disease) ([address]     Depression     GERD (gastroesophageal reflux disease)     Hypertension     Liver disease     Seizures ([address]     Substance abuse ([address]     TBI (traumatic brain injury) ([address]     Tic                     ROS     General: Feeling better     PHYSICAL EXAM    Blood pressure 125/82, pulse 117, temperature 97.7 °F (36.5 °C), height 5' 7\" (1.702 m), weight 163 lb (73.9 kg).          General:    Musculoskeletal: No abnormal joint findings or muscle findings     Skin: No rashes, lesions or abnormalities noted  he has multiple pick marks on his face  Mental Status Examination:  Level of consciousness:  within normal limits and awake  Appearance:  well-appearing, in chair, good grooming and good hygiene  Behavior/Motor: Bipolar affective disorder in remission ([facility]     6. Hep C w/o coma, chronic (HCC)     7. Essential hypertension     8.  Chronic obstructive pulmonary disease, unspecified COPD type ([facility]         :PLAN:  He does have hep C,  Urine negative for everything except buprenorphine     There is confusion regarding the cardiovascular consultation in [place]  Dr. Marcia Pang had recommended a surgeon for his aneurysm in [place]  That surgeon told our office that patient had to have dental work prior to him seeing the patient  Patient scheduled go to the dentist Monday  Girlfriend says she keeps a close eye on him    Continue Suboxone 8 mg twice daily  Follow-up 1 week

## 2021-10-13 ENCOUNTER — OFFICE VISIT (OUTPATIENT)
Dept: INTERNAL MEDICINE CLINIC | Age: 56
End: 2021-10-13
Payer: MEDICAID

## 2021-10-13 VITALS
HEART RATE: 105 BPM | SYSTOLIC BLOOD PRESSURE: 149 MMHG | HEIGHT: 67 IN | BODY MASS INDEX: 24.8 KG/M2 | TEMPERATURE: 98.3 F | DIASTOLIC BLOOD PRESSURE: 78 MMHG | WEIGHT: 158 LBS

## 2021-10-13 DIAGNOSIS — Z79.899 ENCOUNTER FOR MONITORING SUBOXONE MAINTENANCE THERAPY: ICD-10-CM

## 2021-10-13 DIAGNOSIS — I71.20 THORACIC AORTIC ANEURYSM WITHOUT RUPTURE: ICD-10-CM

## 2021-10-13 DIAGNOSIS — F19.10 POLYSUBSTANCE ABUSE (HCC): ICD-10-CM

## 2021-10-13 DIAGNOSIS — F11.20 SEVERE OPIOID USE DISORDER (HCC): Primary | ICD-10-CM

## 2021-10-13 DIAGNOSIS — B18.2 HEP C W/O COMA, CHRONIC (HCC): ICD-10-CM

## 2021-10-13 DIAGNOSIS — F31.70 BIPOLAR AFFECTIVE DISORDER IN REMISSION (HCC): ICD-10-CM

## 2021-10-13 DIAGNOSIS — Z51.81 ENCOUNTER FOR MONITORING SUBOXONE MAINTENANCE THERAPY: ICD-10-CM

## 2021-10-13 PROCEDURE — 4004F PT TOBACCO SCREEN RCVD TLK: CPT | Performed by: INTERNAL MEDICINE

## 2021-10-13 PROCEDURE — G8420 CALC BMI NORM PARAMETERS: HCPCS | Performed by: INTERNAL MEDICINE

## 2021-10-13 PROCEDURE — 80305 DRUG TEST PRSMV DIR OPT OBS: CPT | Performed by: INTERNAL MEDICINE

## 2021-10-13 PROCEDURE — 99213 OFFICE O/P EST LOW 20 MIN: CPT | Performed by: INTERNAL MEDICINE

## 2021-10-13 PROCEDURE — 3017F COLORECTAL CA SCREEN DOC REV: CPT | Performed by: INTERNAL MEDICINE

## 2021-10-13 PROCEDURE — G8484 FLU IMMUNIZE NO ADMIN: HCPCS | Performed by: INTERNAL MEDICINE

## 2021-10-13 PROCEDURE — G8428 CUR MEDS NOT DOCUMENT: HCPCS | Performed by: INTERNAL MEDICINE

## 2021-10-13 RX ORDER — BUPRENORPHINE AND NALOXONE 8; 2 MG/1; MG/1
1 FILM, SOLUBLE BUCCAL; SUBLINGUAL 2 TIMES DAILY
Qty: 14 FILM | Refills: 0 | Status: SHIPPED | OUTPATIENT
Start: 2021-10-13 | End: 2021-10-20 | Stop reason: SDUPTHER

## 2021-10-13 NOTE — PROGRESS NOTES
Verbal order per Dr. Jayesh Pearce for urine drug screen. Positive for BUP THC. Verified results with Amanda Galvan. Dr. Jayesh Pearce ordered Suboxone 8mg film BID for patient. Verified dose with patient. Patient was sent home with 1 week script of Suboxone 8mg film BID and will be seen back in the office 10/20/21.

## 2021-10-14 DIAGNOSIS — I71.20 THORACIC AORTIC ANEURYSM WITHOUT RUPTURE: Primary | ICD-10-CM

## 2021-10-20 ENCOUNTER — OFFICE VISIT (OUTPATIENT)
Dept: INTERNAL MEDICINE CLINIC | Age: 56
End: 2021-10-20
Payer: MEDICAID

## 2021-10-20 DIAGNOSIS — Z79.899 ENCOUNTER FOR MONITORING SUBOXONE MAINTENANCE THERAPY: ICD-10-CM

## 2021-10-20 DIAGNOSIS — F19.10 POLYSUBSTANCE ABUSE (HCC): ICD-10-CM

## 2021-10-20 DIAGNOSIS — I71.20 THORACIC AORTIC ANEURYSM WITHOUT RUPTURE: ICD-10-CM

## 2021-10-20 DIAGNOSIS — F11.20 SEVERE OPIOID USE DISORDER (HCC): Primary | ICD-10-CM

## 2021-10-20 DIAGNOSIS — I10 ESSENTIAL HYPERTENSION: ICD-10-CM

## 2021-10-20 DIAGNOSIS — F31.70 BIPOLAR AFFECTIVE DISORDER IN REMISSION (HCC): ICD-10-CM

## 2021-10-20 DIAGNOSIS — Z51.81 ENCOUNTER FOR MONITORING SUBOXONE MAINTENANCE THERAPY: ICD-10-CM

## 2021-10-20 DIAGNOSIS — B18.2 HEP C W/O COMA, CHRONIC (HCC): ICD-10-CM

## 2021-10-20 PROCEDURE — G8420 CALC BMI NORM PARAMETERS: HCPCS | Performed by: INTERNAL MEDICINE

## 2021-10-20 PROCEDURE — G8484 FLU IMMUNIZE NO ADMIN: HCPCS | Performed by: INTERNAL MEDICINE

## 2021-10-20 PROCEDURE — 4004F PT TOBACCO SCREEN RCVD TLK: CPT | Performed by: INTERNAL MEDICINE

## 2021-10-20 PROCEDURE — 99213 OFFICE O/P EST LOW 20 MIN: CPT | Performed by: INTERNAL MEDICINE

## 2021-10-20 PROCEDURE — 3017F COLORECTAL CA SCREEN DOC REV: CPT | Performed by: INTERNAL MEDICINE

## 2021-10-20 PROCEDURE — 80305 DRUG TEST PRSMV DIR OPT OBS: CPT | Performed by: INTERNAL MEDICINE

## 2021-10-20 PROCEDURE — G8428 CUR MEDS NOT DOCUMENT: HCPCS | Performed by: INTERNAL MEDICINE

## 2021-10-20 RX ORDER — BUPRENORPHINE AND NALOXONE 8; 2 MG/1; MG/1
1 FILM, SOLUBLE BUCCAL; SUBLINGUAL 2 TIMES DAILY
Qty: 14 FILM | Refills: 0 | Status: SHIPPED | OUTPATIENT
Start: 2021-10-20 | End: 2021-10-27 | Stop reason: SDUPTHER

## 2021-10-20 NOTE — PROGRESS NOTES
Verbal order per Dr. Lashell Dan for urine drug screen. Negative for all drugs. Verified results with Ritesh Ocampo. Dr. Lashell Dan ordered Suboxone 8mg film BID  for patient. Verified dose with patient. Patient was sent home with 1 week script of Suboxone 8mg film BID and will be seen back in the office 10/27/21. UC and oral swab sent.

## 2021-10-20 NOTE — PROGRESS NOTES
MEDICATION ASSISTED TREATMENT ENCOUNTER    HISTORY OF PRESENT ILLNESS  Patient presents for evaluation of opioid use and would like to be placed on medication assisted treatment  I saw the patient here  6/29 for the first time since March, last time here 10/13  I see his girlfriend as well    He says that she threw him out said he was using  I see her son who told me that he is using as well  He denies it      I put him back on Suboxone  He is here with his girlfriend  A couple months ago he said he relapsed on what he thought was ice but it made him go to sleep  He is actually been doing this for the last couple of months  He said the rescue squad had to Narcan him 3 times  He said he was admitted for a day to [facility] but signed himself out the next day  They told him he had a heart issue, they did an echocardiogram  He is back with his girlfriend she had kicked him out because he was using he was on the street for couple of months    He saw Dr. Mariusz Lal from cardiovascular surgery  He said basically he told him since he had his testing done at [facility] he should see someone there    He says he has not relapsed     Past Medical History:   Diagnosis Date    Allergic rhinitis     Bipolar disorder ([facility]     Chronic back pain     COPD (chronic obstructive pulmonary disease) ([facility]     Depression     GERD (gastroesophageal reflux disease)     Hypertension     Liver disease     Seizures ([facility]     Substance abuse ([facility]     TBI (traumatic brain injury) ([facility]     Tic                     ROS     General: Feeling better     PHYSICAL EXAM    There were no vitals taken for this visit.          General:    Musculoskeletal: No abnormal joint findings or muscle findings     Skin: No rashes, lesions or abnormalities noted  he has multiple pick marks on his face  Mental Status Examination:  Level of consciousness:  within normal limits and awake  Appearance: well-appearing, in chair, good grooming and good hygiene  Behavior/Motor:  {Normal  Attitude toward examiner:  cooperative and attentive  Speech:  spontaneous and normal volume  Mood: Upbeat  Affect:  mood congruent  Thought processes:  linear  Thought content:  Denies homicidal ideations  Suicidal Ideation:  denies suicidal ideation  Delusions:  no evidence of delusions  Perceptual Disturbance:  denies any perceptual disturbance  Cognition:  oriented to person, place, and time    Insight : poor  Judgment: poor  Medication Side Effects:none        URINE DRUG SCREEN TODAY  Alcohol, Urine 10/20/2021  4:15 PM Unknown   NEG    Amphetamine Screen, Urine 10/20/2021  4:15 PM Unknown   NEG    Barbiturate Screen, Urine 10/20/2021  4:15 PM Unknown   NEG    Benzodiazepine Screen, Urine 10/20/2021  4:15 PM Unknown   NEG    Buprenorphine Urine 10/20/2021  4:15 PM Unknown   NEG    Cocaine Metabolite Screen, Urine 10/20/2021  4:15 PM Unknown   NEG    FENTANYL SCREEN, URINE 10/20/2021  4:15 PM Unknown   NEG    Gabapentin Screen, Urine 10/20/2021  4:15 PM Unknown   N/A    MDMA, Urine 10/20/2021  4:15 PM Unknown   NEG    Methadone Screen, Urine 10/20/2021  4:15 PM Unknown   NEG    Methamphetamine, Urine 10/20/2021  4:15 PM Unknown   NEG    Opiate Scrn, Ur 10/20/2021  4:15 PM Unknown   NEG    Oxycodone Screen, Ur 10/20/2021  4:15 PM Unknown   NEG    PCP Screen, Urine 10/20/2021  4:15 PM Unknown   NEG    Propoxyphene Screen, Urine 10/20/2021  4:15 PM Unknown   N/A    Synthetic Cannabinoids (K2) Screen, Urine 10/20/2021  4:15 PM Unknown   NEG    THC Screen, Urine 10/20/2021  4:15 PM Unknown   NEG    Tramadol Scrn, Ur 10/20/2021  4:15 PM Unknown   NEG    Tricyclic Antidepressants, Urine 10/20/2021  4:15 PM Unknown   N/A         Diagnosis Orders   1. Severe opioid use disorder (HCC)  POCT Rapid Drug Screen    buprenorphine-naloxone (SUBOXONE) 8-2 MG FILM SL film   2. Thoracic aortic aneurysm without rupture ([address]     3.  Encounter for

## 2021-10-22 NOTE — PROGRESS NOTES
MEDICATION ASSISTED TREATMENT ENCOUNTER    HISTORY OF PRESENT ILLNESS  Patient presents for evaluation of opioid use and would like to be placed on medication assisted treatment  I saw the patient here  6/29 for the first time since March, last time here 10/6      I put him back on Suboxone  He is here with his girlfriend  A couple months ago he said he relapsed on what he thought was ice but it made him go to sleep  He is actually been doing this for the last couple of months  He said the rescue squad had to Narcan him 3 times  He said he was admitted for a day to [facility] but signed himself out the next day  They told him he had a heart issue, they did an echocardiogram  He is back with his girlfriend she had kicked him out because he was using he was on the street for couple of months    He saw Dr. Amber Bush from cardiovascular surgery  He said basically he told him since he had his testing done at [facility] he should see someone there    He says he has not relapsed     Past Medical History:   Diagnosis Date    Allergic rhinitis     Bipolar disorder ([address]     Chronic back pain     COPD (chronic obstructive pulmonary disease) ([address]     Depression     GERD (gastroesophageal reflux disease)     Hypertension     Liver disease     Seizures ([address]     Substance abuse ([address]     TBI (traumatic brain injury) ([address]     Tic                     ROS     General: Feeling better     PHYSICAL EXAM    Blood pressure (!) 149/78, pulse 105, temperature 98.3 °F (36.8 °C), height 5' 7\" (1.702 m), weight 158 lb (71.7 kg).          General:    Musculoskeletal: No abnormal joint findings or muscle findings     Skin: No rashes, lesions or abnormalities noted  he has multiple pick marks on his face  Mental Status Examination:  Level of consciousness:  within normal limits and awake  Appearance:  well-appearing, in chair, good grooming and good hygiene  Behavior/Motor: {Normal  Attitude toward examiner:  cooperative and attentive  Speech:  spontaneous and normal volume  Mood: Upbeat  Affect:  mood congruent  Thought processes:  linear  Thought content:  Denies homicidal ideations  Suicidal Ideation:  denies suicidal ideation  Delusions:  no evidence of delusions  Perceptual Disturbance:  denies any perceptual disturbance  Cognition:  oriented to person, place, and time    Insight : poor  Judgment: poor  Medication Side Effects:none        URINE DRUG SCREEN TODAY  Alcohol, Urine 10/13/2021  4:31 PM Unknown   NEG    Amphetamine Screen, Urine 10/13/2021  4:31 PM Unknown   NEG    Barbiturate Screen, Urine 10/13/2021  4:31 PM Unknown   NEG    Benzodiazepine Screen, Urine 10/13/2021  4:31 PM Unknown   NEG    Buprenorphine Urine 10/13/2021  4:31 PM Unknown   POS    Cocaine Metabolite Screen, Urine 10/13/2021  4:31 PM Unknown   NEG    FENTANYL SCREEN, URINE 10/13/2021  4:31 PM Unknown   NEG    Gabapentin Screen, Urine 10/13/2021  4:31 PM Unknown   N/A    MDMA, Urine 10/13/2021  4:31 PM Unknown   NEG    Methadone Screen, Urine 10/13/2021  4:31 PM Unknown   NEG    Methamphetamine, Urine 10/13/2021  4:31 PM Unknown   NEG    Opiate Scrn, Ur 10/13/2021  4:31 PM Unknown   NEG    Oxycodone Screen, Ur 10/13/2021  4:31 PM Unknown   NEG    PCP Screen, Urine 10/13/2021  4:31 PM Unknown   NEG    Propoxyphene Screen, Urine 10/13/2021  4:31 PM Unknown   N/A    Synthetic Cannabinoids (K2) Screen, Urine 10/13/2021  4:31 PM Unknown   NEG    THC Screen, Urine 10/13/2021  4:31 PM Unknown   POS    Tramadol Scrn, Ur 10/13/2021  4:31 PM Unknown   NEG    Tricyclic Antidepressants, Urine 10/13/2021  4:31 PM Unknown   N/A    Lab and Collection       Diagnosis Orders   1. Severe opioid use disorder (HCC)  POCT Rapid Drug Screen    DISCONTINUED: buprenorphine-naloxone (SUBOXONE) 8-2 MG FILM SL film   2. Thoracic aortic aneurysm without rupture ([facility]     3.  Encounter for monitoring Suboxone maintenance therapy 4. Hep C w/o coma, chronic (HCC)     5. Bipolar affective disorder in remission ([facility]     6.  Polysubstance abuse ([facility]         :PLAN:  He does have hep C,  Urine negative for everything except buprenorphine     There is confusion regarding the cardiovascular consultation in [place]  Dr. Makayla Rojas had recommended a surgeon for his aneurysm in [place]  That surgeon told our office that patient had to have dental work prior to him seeing the patient  Patient saw the dentist  I will try to get the cardiovascular surgeon in [place] to see him      Continue Suboxone 8 mg twice daily  Follow-up 1 week

## 2021-10-27 ENCOUNTER — OFFICE VISIT (OUTPATIENT)
Dept: INTERNAL MEDICINE CLINIC | Age: 56
End: 2021-10-27
Payer: MEDICAID

## 2021-10-27 VITALS
BODY MASS INDEX: 24.8 KG/M2 | TEMPERATURE: 99.1 F | HEIGHT: 67 IN | SYSTOLIC BLOOD PRESSURE: 177 MMHG | WEIGHT: 158 LBS | HEART RATE: 120 BPM | DIASTOLIC BLOOD PRESSURE: 74 MMHG

## 2021-10-27 DIAGNOSIS — B18.2 HEP C W/O COMA, CHRONIC (HCC): ICD-10-CM

## 2021-10-27 DIAGNOSIS — F11.20 SEVERE OPIOID USE DISORDER (HCC): Primary | ICD-10-CM

## 2021-10-27 DIAGNOSIS — I10 ESSENTIAL HYPERTENSION: ICD-10-CM

## 2021-10-27 DIAGNOSIS — I71.20 THORACIC AORTIC ANEURYSM WITHOUT RUPTURE: ICD-10-CM

## 2021-10-27 DIAGNOSIS — F19.10 POLYSUBSTANCE ABUSE (HCC): ICD-10-CM

## 2021-10-27 DIAGNOSIS — Z51.81 ENCOUNTER FOR MONITORING SUBOXONE MAINTENANCE THERAPY: ICD-10-CM

## 2021-10-27 DIAGNOSIS — Z79.899 ENCOUNTER FOR MONITORING SUBOXONE MAINTENANCE THERAPY: ICD-10-CM

## 2021-10-27 DIAGNOSIS — F31.70 BIPOLAR AFFECTIVE DISORDER IN REMISSION (HCC): ICD-10-CM

## 2021-10-27 PROCEDURE — G8484 FLU IMMUNIZE NO ADMIN: HCPCS | Performed by: INTERNAL MEDICINE

## 2021-10-27 PROCEDURE — G8420 CALC BMI NORM PARAMETERS: HCPCS | Performed by: INTERNAL MEDICINE

## 2021-10-27 PROCEDURE — G8428 CUR MEDS NOT DOCUMENT: HCPCS | Performed by: INTERNAL MEDICINE

## 2021-10-27 PROCEDURE — 99213 OFFICE O/P EST LOW 20 MIN: CPT | Performed by: INTERNAL MEDICINE

## 2021-10-27 PROCEDURE — 80305 DRUG TEST PRSMV DIR OPT OBS: CPT | Performed by: INTERNAL MEDICINE

## 2021-10-27 PROCEDURE — 3017F COLORECTAL CA SCREEN DOC REV: CPT | Performed by: INTERNAL MEDICINE

## 2021-10-27 PROCEDURE — 4004F PT TOBACCO SCREEN RCVD TLK: CPT | Performed by: INTERNAL MEDICINE

## 2021-10-27 RX ORDER — BUPRENORPHINE AND NALOXONE 8; 2 MG/1; MG/1
1 FILM, SOLUBLE BUCCAL; SUBLINGUAL 2 TIMES DAILY
Qty: 14 FILM | Refills: 0 | Status: SHIPPED | OUTPATIENT
Start: 2021-10-27 | End: 2021-11-03

## 2021-10-27 NOTE — PROGRESS NOTES
Normal    Mood: Good mood, upbeat    Affect: Mood congruent    Attitude toward examiner: Pleasant, respectful    Thought content no delusions hallucinations suicidal ideation    Insight: Poor    Judgment: Poor    Eyes: Pupils normal    Skin: No track marks noted ,no rashes noted    COWS score: N/A              URINE DRUG SCREEN TODAY  Alcohol, Urine 10/27/2021  3:07 PM Unknown   NEG    Amphetamine Screen, Urine 10/27/2021  3:07 PM Unknown   NEG    Barbiturate Screen, Urine 10/27/2021  3:07 PM Unknown   NEG    Benzodiazepine Screen, Urine 10/27/2021  3:07 PM Unknown   NEG    Buprenorphine Urine 10/27/2021  3:07 PM Unknown   NEG    Cocaine Metabolite Screen, Urine 10/27/2021  3:07 PM Unknown   NEG    FENTANYL SCREEN, URINE 10/27/2021  3:07 PM Unknown   NEG    Gabapentin Screen, Urine 10/27/2021  3:07 PM Unknown   N/A    MDMA, Urine 10/27/2021  3:07 PM Unknown   NEG    Methadone Screen, Urine 10/27/2021  3:07 PM Unknown   NEG    Methamphetamine, Urine 10/27/2021  3:07 PM Unknown   NEG    Opiate Scrn, Ur 10/27/2021  3:07 PM Unknown   NEG    Oxycodone Screen, Ur 10/27/2021  3:07 PM Unknown   NEG    PCP Screen, Urine 10/27/2021  3:07 PM Unknown   NEG    Propoxyphene Screen, Urine 10/27/2021  3:07 PM Unknown   N/A    Synthetic Cannabinoids (K2) Screen, Urine 10/27/2021  3:07 PM Unknown   NEG    THC Screen, Urine 10/27/2021  3:07 PM Unknown   NEG    Tramadol Scrn, Ur 10/27/2021  3:07 PM Unknown   NEG    Tricyclic Antidepressants, Urine 10/27/2021  3:07 PM Unknown   N/A         Diagnosis Orders   1. Severe opioid use disorder (HCC)  POCT Rapid Drug Screen    buprenorphine-naloxone (SUBOXONE) 8-2 MG FILM SL film   2. Thoracic aortic aneurysm without rupture ([facility]     3. Hep C w/o coma, chronic (HCC)     4. Bipolar affective disorder in remission ([facility]     5. Polysubstance abuse ([facility]     6. Encounter for monitoring Suboxone maintenance therapy     7.  Essential hypertension         :PLAN:  Urine has nothing in it again  Oral swab from last visit shows cocaine methamphetamines fentanyl hydromorphone heroin metabolite THC  I told the patient who always professes honesty that he is never honest with me  I was going to give him a list of other programs to go to  He said he really wants one more chance he has nobody else that he can turn to  I told him I will give him a week  Next visit I will directly observe his urine specimen  We will send oral swab as well as comprehensive urine  If he is dirty for anything besides buprenorphine and THC I will no longer see him, he agrees  He does have a thoracic aortic aneurysm we are trying to get him an appoint with a surgeon in [address]  They required a dental appointment prior to them seeing him the patient had this done    Continue Suboxone 8 mg twice daily  Follow-up 1 week

## 2021-10-27 NOTE — PROGRESS NOTES
Verbal order per Dr. Irina Monte for urine drug screen. Negative for all drugs. Verified results with Bakari Swanson. Dr. Irina Monte ordered Suboxone 8mg film BID for patient. Verified dose with patient. Patient was sent home with 1 week script of Suboxone 8mg film BID and will be seen back in the office 11/3/21.

## 2021-11-16 ENCOUNTER — OFFICE VISIT (OUTPATIENT)
Dept: INTERNAL MEDICINE CLINIC | Age: 56
End: 2021-11-16
Payer: MEDICAID

## 2021-11-16 VITALS
HEART RATE: 98 BPM | DIASTOLIC BLOOD PRESSURE: 82 MMHG | WEIGHT: 160 LBS | TEMPERATURE: 96.3 F | HEIGHT: 67 IN | BODY MASS INDEX: 25.11 KG/M2 | SYSTOLIC BLOOD PRESSURE: 192 MMHG

## 2021-11-16 DIAGNOSIS — F11.20 SEVERE OPIOID USE DISORDER (HCC): Primary | ICD-10-CM

## 2021-11-16 DIAGNOSIS — F19.10 POLYSUBSTANCE ABUSE (HCC): ICD-10-CM

## 2021-11-16 DIAGNOSIS — B18.2 HEP C W/O COMA, CHRONIC (HCC): ICD-10-CM

## 2021-11-16 DIAGNOSIS — Z51.81 ENCOUNTER FOR MONITORING SUBOXONE MAINTENANCE THERAPY: ICD-10-CM

## 2021-11-16 DIAGNOSIS — I71.20 THORACIC AORTIC ANEURYSM WITHOUT RUPTURE: ICD-10-CM

## 2021-11-16 DIAGNOSIS — Z79.899 ENCOUNTER FOR MONITORING SUBOXONE MAINTENANCE THERAPY: ICD-10-CM

## 2021-11-16 DIAGNOSIS — F31.70 BIPOLAR AFFECTIVE DISORDER IN REMISSION (HCC): ICD-10-CM

## 2021-11-16 PROCEDURE — 3017F COLORECTAL CA SCREEN DOC REV: CPT | Performed by: INTERNAL MEDICINE

## 2021-11-16 PROCEDURE — G8419 CALC BMI OUT NRM PARAM NOF/U: HCPCS | Performed by: INTERNAL MEDICINE

## 2021-11-16 PROCEDURE — G8428 CUR MEDS NOT DOCUMENT: HCPCS | Performed by: INTERNAL MEDICINE

## 2021-11-16 PROCEDURE — 99214 OFFICE O/P EST MOD 30 MIN: CPT | Performed by: INTERNAL MEDICINE

## 2021-11-16 PROCEDURE — 80305 DRUG TEST PRSMV DIR OPT OBS: CPT | Performed by: INTERNAL MEDICINE

## 2021-11-16 PROCEDURE — 4004F PT TOBACCO SCREEN RCVD TLK: CPT | Performed by: INTERNAL MEDICINE

## 2021-11-16 PROCEDURE — G8484 FLU IMMUNIZE NO ADMIN: HCPCS | Performed by: INTERNAL MEDICINE

## 2021-11-16 RX ORDER — BUPRENORPHINE AND NALOXONE 8; 2 MG/1; MG/1
1 FILM, SOLUBLE BUCCAL; SUBLINGUAL 2 TIMES DAILY
Qty: 14 FILM | Refills: 0 | Status: SHIPPED | OUTPATIENT
Start: 2021-11-16 | End: 2021-11-23

## 2021-11-16 NOTE — PROGRESS NOTES
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 Stanley Kaur from cardiovascular surgery  He said basically he told him since he had his testing done [facility] he should see someone there    He says he has not relapsed     Past Medical History:   Diagnosis Date    Allergic rhinitis     Bipolar disorder ([address]     Chronic back pain     COPD (chronic obstructive pulmonary disease) ([address]     Depression     GERD (gastroesophageal reflux disease)     Hypertension     Liver disease     Seizures ([address]     Substance abuse ([address]     TBI (traumatic brain injury) ([facility]     Tic                     ROS     General: Feeling better     PHYSICAL EXAM    Blood pressure (!) 192/82, pulse 98, temperature 96.3 °F (35.7 °C), height 5' 7\" (1.702 m), weight 160 lb (72.6 kg).       Mental status examination    Cognition: oriented to person place and time      Appearance: Patient is in no acute distress, normal appearance, patient does not appear intoxicated/    Memory: Normal    Behavior/motor: Normal    Mood: Good mood, upbeat    Affect: Mood congruent    Attitude toward examiner: Pleasant, respectful    Thought content no delusions hallucinations suicidal ideation    Insight: Poor    Judgment: Poor    Eyes: Pupils normal    Skin: No track marks noted ,no rashes noted    COWS score: N/A              URINE DRUG SCREEN TODAY  Alcohol, Urine NEG    Amphetamine Screen, Urine NEG    Barbiturate Screen, Urine NEG    Benzodiazepine Screen, Urine NEG    Buprenorphine Urine NEG    Cocaine Metabolite Screen, Urine NEG    FENTANYL SCREEN, URINE NEG    Gabapentin Screen, Urine N/A    MDMA, Urine NEG    Methadone Screen, Urine NEG    Methamphetamine, Urine NEG    Opiate Scrn, Ur NEG    Oxycodone Screen, Ur NEG    PCP Screen, Urine NEG    Propoxyphene Screen, Urine N/A    Synthetic Cannabinoids (K2) Screen, Urine NEG    THC Screen, Urine NEG    Tramadol Scrn, Ur NEG    Tricyclic Antidepressants, Urine N/A        Diagnosis Orders   1. Severe opioid use disorder (HCC)  buprenorphine-naloxone (SUBOXONE) 8-2 MG FILM SL film    POCT Rapid Drug Screen   2. [facility] aneurysm without rupture ([facility]     3. Hep C w/o coma, chronic (HCC)     4. Encounter for monitoring Suboxone maintenance therapy     5. Polysubstance abuse ([facility]     6.  Bipolar affective disorder in remission [facility])         :PLAN:  Urine has nothing in it again  He says he ran out of Suboxone  I told the patient who always professes honesty that he is never honest with me  I will give him Suboxone 8 mg twice daily for 7 days for opioid use disorder  I think it is good that he has a job now    He does have a thoracic aortic aneurysm we are trying to get him an appoint with a surgeon in [place]  Girlfriend says she is going to make this happen  I reviewed the [place] Automated Rx Reporting System report     There does not appear to be any discrepancies or overprescribing of controlled substances  He has hepatitis C that needs treated

## 2021-11-16 NOTE — PROGRESS NOTES
Verbal order per Dr. Jessica Martinez for urine drug screen. Negative for all drugs. Verified results with Homa Gibson. Dr. Jessica Martinez ordered Suboxone 8mg film BID for patient. Verified dose with patient. Patient was sent home with 1 week script of Suboxone 8mg film BID and will be seen back in the office 11/23/21.

## 2021-12-15 ENCOUNTER — OFFICE VISIT (OUTPATIENT)
Dept: INTERNAL MEDICINE CLINIC | Age: 56
End: 2021-12-15
Payer: MEDICAID

## 2021-12-15 ENCOUNTER — TELEPHONE (OUTPATIENT)
Dept: INTERNAL MEDICINE CLINIC | Age: 56
End: 2021-12-15

## 2021-12-15 VITALS
SYSTOLIC BLOOD PRESSURE: 161 MMHG | DIASTOLIC BLOOD PRESSURE: 112 MMHG | HEART RATE: 115 BPM | TEMPERATURE: 98.2 F | BODY MASS INDEX: 24.96 KG/M2 | HEIGHT: 67 IN | WEIGHT: 159 LBS

## 2021-12-15 DIAGNOSIS — B18.2 HEP C W/O COMA, CHRONIC (HCC): ICD-10-CM

## 2021-12-15 DIAGNOSIS — F19.10 POLYSUBSTANCE ABUSE (HCC): ICD-10-CM

## 2021-12-15 DIAGNOSIS — Z79.899 ENCOUNTER FOR MONITORING SUBOXONE MAINTENANCE THERAPY: ICD-10-CM

## 2021-12-15 DIAGNOSIS — I10 ESSENTIAL HYPERTENSION: ICD-10-CM

## 2021-12-15 DIAGNOSIS — Z51.81 ENCOUNTER FOR MONITORING SUBOXONE MAINTENANCE THERAPY: ICD-10-CM

## 2021-12-15 DIAGNOSIS — F11.20 SEVERE OPIOID USE DISORDER (HCC): Primary | ICD-10-CM

## 2021-12-15 DIAGNOSIS — I71.20 THORACIC AORTIC ANEURYSM WITHOUT RUPTURE: ICD-10-CM

## 2021-12-15 DIAGNOSIS — F31.70 BIPOLAR AFFECTIVE DISORDER IN REMISSION (HCC): ICD-10-CM

## 2021-12-15 PROCEDURE — 4004F PT TOBACCO SCREEN RCVD TLK: CPT | Performed by: INTERNAL MEDICINE

## 2021-12-15 PROCEDURE — 3017F COLORECTAL CA SCREEN DOC REV: CPT | Performed by: INTERNAL MEDICINE

## 2021-12-15 PROCEDURE — G8428 CUR MEDS NOT DOCUMENT: HCPCS | Performed by: INTERNAL MEDICINE

## 2021-12-15 PROCEDURE — G8420 CALC BMI NORM PARAMETERS: HCPCS | Performed by: INTERNAL MEDICINE

## 2021-12-15 PROCEDURE — G8484 FLU IMMUNIZE NO ADMIN: HCPCS | Performed by: INTERNAL MEDICINE

## 2021-12-15 PROCEDURE — 99214 OFFICE O/P EST MOD 30 MIN: CPT | Performed by: INTERNAL MEDICINE

## 2021-12-15 PROCEDURE — 80305 DRUG TEST PRSMV DIR OPT OBS: CPT | Performed by: INTERNAL MEDICINE

## 2021-12-15 RX ORDER — BUPRENORPHINE AND NALOXONE 8; 2 MG/1; MG/1
1 FILM, SOLUBLE BUCCAL; SUBLINGUAL DAILY
COMMUNITY
End: 2021-12-15 | Stop reason: SDUPTHER

## 2021-12-15 RX ORDER — LOSARTAN POTASSIUM 50 MG/1
50 TABLET ORAL 2 TIMES DAILY
Qty: 60 TABLET | Refills: 0 | Status: SHIPPED | OUTPATIENT
Start: 2021-12-15

## 2021-12-15 RX ORDER — BUPRENORPHINE AND NALOXONE 8; 2 MG/1; MG/1
1 FILM, SOLUBLE BUCCAL; SUBLINGUAL 2 TIMES DAILY
Qty: 14 FILM | Refills: 0 | Status: SHIPPED | OUTPATIENT
Start: 2021-12-15 | End: 2021-12-22

## 2021-12-15 NOTE — PROGRESS NOTES
better     PHYSICAL EXAM    Blood pressure (!) 161/112, pulse 115, temperature 98.2 °F (36.8 °C), height 5' 7\" (1.702 m), weight 159 lb (72.1 kg). Mental status examination    Cognition: oriented to person place and time      Appearance: Patient is in no acute distress, normal appearance, patient does not appear intoxicated/    Memory: Normal    Behavior/motor: Normal    Mood: Good mood, upbeat    Affect: Mood congruent    Attitude toward examiner: Pleasant, respectful    Thought content no delusions hallucinations suicidal ideation    Insight: Poor    Judgment: Poor    Eyes: Pupils normal    Skin: No track marks noted ,no rashes noted    COWS score: N/A              URINE DRUG SCREEN TODAY  Component 12/15/21 1030   Alcohol, Urine NEG    Amphetamine Screen, Urine NEG    Barbiturate Screen, Urine NEG    Benzodiazepine Screen, Urine NEG    Buprenorphine Urine NEG    Cocaine Metabolite Screen, Urine NEG    FENTANYL SCREEN, URINE NEG    Gabapentin Screen, Urine N/A    MDMA, Urine NEG    Methadone Screen, Urine NEG    Methamphetamine, Urine NEG    Opiate Scrn, Ur NEG    Oxycodone Screen, Ur NEG    PCP Screen, Urine NEG    Propoxyphene Screen, Urine N/A    Synthetic Cannabinoids (K2) Screen, Urine NEG    THC Screen, Urine NEG    Tramadol Scrn, Ur NEG    Tricyclic Antidepressants, Urine N/A         Diagnosis Orders   1. Severe opioid use disorder (HCC)  POCT Rapid Drug Screen    buprenorphine-naloxone (SUBOXONE) 8-2 MG FILM SL film   2. Hep C w/o coma, chronic (HCC)     3. Thoracic aortic aneurysm without rupture ([facility]     4. Encounter for monitoring Suboxone maintenance therapy     5. Polysubstance abuse ([facility]     6. Bipolar affective disorder in remission ([facility]     7.  Essential hypertension         :PLAN:  Urine has nothing in it again  Plan will be give Suboxone 8 mg twice daily for a week  I spoke with Didier Morejon about the whole situation  She said she was willing to see him but he said he could not make it in    I reviewed the [place] Automated Rx Reporting System report     There does not appear to be any discrepancies or overprescribing of controlled substances  He has hepatitis C that needs treated  I told him this is his last chance  I told the patient that I can only have so many patients at one time on Suboxone by federal law (275)  I told the patient I am approaching that number  If they are not compliant with treatment, provide doctored urines, etc I told the patient I may have to let them go because I want to see new patients who are committed

## 2021-12-15 NOTE — TELEPHONE ENCOUNTER
Pt called at 9:23am stating he was just leaving his home fr his appointment pt appointment is at 9:30am will be late today

## 2022-01-17 ENCOUNTER — HOSPITAL ENCOUNTER (EMERGENCY)
Age: 57
Discharge: HOME | End: 2022-01-17
Attending: EMERGENCY MEDICINE
Payer: MEDICAID

## 2022-01-17 VITALS
HEART RATE: 91 BPM | SYSTOLIC BLOOD PRESSURE: 154 MMHG | TEMPERATURE: 97.6 F | BODY MASS INDEX: 26.68 KG/M2 | HEIGHT: 67 IN | RESPIRATION RATE: 18 BRPM | DIASTOLIC BLOOD PRESSURE: 76 MMHG | WEIGHT: 170 LBS | OXYGEN SATURATION: 95 %

## 2022-01-17 DIAGNOSIS — S61.512A LACERATION OF LEFT WRIST, INITIAL ENCOUNTER: Primary | ICD-10-CM

## 2022-01-17 PROCEDURE — 99281 EMR DPT VST MAYX REQ PHY/QHP: CPT

## 2022-01-17 PROCEDURE — 90715 TDAP VACCINE 7 YRS/> IM: CPT | Performed by: EMERGENCY MEDICINE

## 2022-01-17 PROCEDURE — 90471 IMMUNIZATION ADMIN: CPT | Performed by: EMERGENCY MEDICINE

## 2022-01-17 PROCEDURE — 6360000002 HC RX W HCPCS: Performed by: EMERGENCY MEDICINE

## 2022-01-17 RX ADMIN — TETANUS TOXOID, REDUCED DIPHTHERIA TOXOID AND ACELLULAR PERTUSSIS VACCINE, ADSORBED 0.5 ML: 5; 2.5; 8; 8; 2.5 SUSPENSION INTRAMUSCULAR at 22:09

## 2022-01-18 NOTE — ED NOTES
Discharge teaching and instructions for condition explained to patient. AVS reviewed. Went over prescriptions with patient. Patient voiced understanding regarding prescriptions, follow up appointments and care of self at home.  Pt to lobby waiting on urine sample per PPI request.        Ashley Flores RN  01/17/22 [id number]

## 2022-01-18 NOTE — ED PROVIDER NOTES
[address]  [place] [address]  Phone: [facility] COMPLAINT    Chief Complaint   Patient presents with    Extremity Laceration       HPI    Zabrina Pineda is a 55 y.o. male who presents above-noted complaint. While at work he cut his left forearm. Reaching into still been. Has no foreign body sensation. No weakness. Has a laceration to the left flexor surface of the wrist.  Denies other weakness numbness or tingling or other issues    PAST MEDICAL HISTORY    Past Medical History:   Diagnosis Date    Allergic rhinitis     Bipolar disorder (HCC)     Chronic back pain     [facility]     Depression     GERD (gastroesophageal reflux disease)     Hypertension     Liver disease     Seizures (HCC)     Substance abuse ([facility]     [facility]     Tic        SURGICAL HISTORY    Past Surgical History:   Procedure Laterality Date    FACIAL COSMETIC SURGERY  12/2005    face reconstruction from heavy head trauma       CURRENT MEDICATIONS    Current Outpatient Rx   Medication Sig Dispense Refill    losartan (COZAAR) 50 MG tablet Take 1 tablet by mouth 2 times daily 60 tablet 0    NARCAN 4 MG/0.1ML LIQD nasal spray ADMINISTER A SINGLE SPRAY INTRANASALLY INTO ONE NOSTRIL. CALL 911. MAY REPEAT X 1.          ALLERGIES    No Known Allergies    FAMILY HISTORY    Family History   Problem Relation Age of Onset    Allergy (Severe) Mother     Anemia Mother    [address] [facility] [address] Arthritis Mother     Arrhythmia Mother     Asthma Mother     Coronary Art Dis Mother     Depression Mother     Diabetes Mother     High Blood Pressure Mother     High Cholesterol Mother     Miscarriages / [place] Mother     Alcohol Abuse Father     Early Death Father     High Blood Pressure Father     High Cholesterol Father     Cancer Maternal Aunt     Heart Attack Maternal Aunt     Heart Disease Maternal Aunt     High Blood Pressure Maternal Aunt     Learning Disabilities Maternal Aunt     Cancer Maternal Uncle     Heart Attack Maternal Uncle     Heart Disease Maternal Uncle     High Blood Pressure Maternal Uncle     Learning Disabilities Maternal Uncle     Breast Cancer Paternal Aunt     Cancer Paternal Aunt     Heart Attack Paternal Aunt     Heart Disease Paternal Aunt     High Blood Pressure Paternal Aunt     Learning Disabilities Paternal Aunt     Heart Attack Paternal Uncle     Heart Disease Paternal Uncle     High Blood Pressure Paternal Uncle     Learning Disabilities Paternal Uncle     Heart Attack Maternal Grandmother     Heart Disease Maternal Grandmother     Alcohol Abuse Maternal Grandfather     Heart Attack Maternal Grandfather     Heart Disease Maternal Grandfather     Heart Attack Paternal Grandmother     Heart Disease Paternal Grandmother     Heart Attack Paternal Grandfather     Heart Disease Paternal Grandfather     Substance Abuse Paternal Grandfather     Learning Disabilities Maternal Cousin     Mental Illness Maternal Cousin     Mental Retardation Maternal Cousin     Learning Disabilities Paternal Cousin        SOCIAL HISTORY    Social History     Socioeconomic History    Marital status: Single     Spouse name: None    Number of children: None    Years of education: None    Highest education level: None   Occupational History    None   Tobacco Use    Smoking status: Current Every Day Smoker     Packs/day: 0.50     Years: 35.00     Pack years: 17.50     Types: Cigarettes     Start date: 1/1/1978    Smokeless tobacco: Never Used   Vaping Use    Vaping Use: Never used   Substance and Sexual Activity    Alcohol use: Not Currently    Drug use: Not Currently     Frequency: 2.0 times per week     Types: Marijuana (Corey Ruy), Opiates , Methamphetamines (Crystal Meth)     Comment: last used meth 11/30/20.  last used heroin 11/18/20    Sexual activity: Yes     Partners: Female   Other Topics Concern    None   Social History Narrative    None     Social Determinants of Health     Financial Resource Strain:     Difficulty of Paying Living Expenses: Not on file   Food Insecurity:     Worried About Running Out of Food in the Last Year: Not on file    Karin of Food in the Last Year: Not on file   Transportation Needs:     Lack of Transportation (Medical): Not on file    Lack of Transportation (Non-Medical): Not on file   Physical Activity:     Days of Exercise per Week: Not on file    Minutes of Exercise per Session: Not on file   Stress:     Feeling of Stress : Not on file   Social Connections:     Frequency of Communication with Friends and Family: Not on file    Frequency of Social Gatherings with Friends and Family: Not on file    Attends Hinduism Services: Not on file    Active Member of [address] [place] [address] or Organizations: Not on file    Attends Club or Organization Meetings: Not on file    Marital Status: Not on file   Intimate Partner Violence:     Fear of Current or Ex-Partner: Not on file    Emotionally Abused: Not on file    Physically Abused: Not on file    Sexually Abused: Not on file   Housing Stability:     Unable to Pay for Housing in the Last Year: Not on file    Number of [place] in the Last Year: Not on file    Unstable Housing in the Last Year: Not on file       REVIEW OF SYSTEMS    Positive for wrist laceration without weakness numbness or tingling or other trauma. No foreign body sensation  All systems negative except as marked. PHYSICAL EXAM    VITAL SIGNS: Ht 5' 7\" (1.702 m)   Wt 170 lb (77.1 kg)   BMI 26.63 kg/m²    Constitutional:  Alert not toxtic or ill,   HENT:  Normocephalic, Atraumatic  Cervical Spine: Normal range of motion,  No stridor.    Eyes:  No discharge or  Swelling  Respiratory: No respiratory distress  Musculoskeletal:  Intact distal pulses, full range of motion with the left wrist.  There is 1 cm laceration on the flexor surface. No step-offs or bony deformities. It does not significantly gape although may be a little bit in the middle. No arterial bleeding or venous bleeding currently. Integument:  Warm, Dry, No erythema, No rash (on exposed areas)   Neurologic:  Alert & appropriate   Psychiatric:  Affect normal    EKG                      RADIOLOGY    No orders to display       PROCEDURES    Wound was cleaned with wound wash. Approximates well with holding together. It was glued using Dermabond without complication. CONSULTS:  None        ED COURSE & MEDICAL DECISION MAKING    Pertinent Labs & Imaging studies reviewed. (See chart for details)  Patient prefer not to have any suture repair at this time. Would like either glue or or modifying. Offered him glue at this time. There is some risk that it may open with forced extension of the wrist although even then the wound should well approximated eventually and not have other problems. We will update his tetanus. SCREENINGS  Ht 5' 7\" (1.702 m)   Wt 170 lb (77.1 kg)   BMI 26.63 kg/m²      No orders to display       Screening For Hypertension and Follow-up (#317)   previously diagnosed with hypertension and not applicable for screen      Screening For Tobacco Use and Cessation Intervention (#226):   reports that he has been smoking cigarettes. He started smoking about 44 years ago. He has a 17.50 pack-year smoking history. He has never used smokeless tobacco.  Tobacco cessation encouraged with brief counseling. Written home care instructions for smoking cessation provided. FINAL IMPRESSION    1.  Laceration of left wrist, initial encounter         PATIENT REFERRED TO:  Occupational medicine    Call in 1 day  For evaluation      DISCHARGE MEDICATIONS:  New Prescriptions    No medications on file           Stephen Joaquin MD  01/17/22 6330

## 2022-01-18 NOTE — ED NOTES
Pt gave second urine sample. Temp appropriate. Left with supervisor.       Liliane Prather RN  01/17/22 4770

## 2022-02-18 ENCOUNTER — HOSPITAL ENCOUNTER (EMERGENCY)
Age: 57
Discharge: HOME | End: 2022-02-18
Payer: MEDICAID

## 2022-02-18 VITALS
RESPIRATION RATE: 16 BRPM | DIASTOLIC BLOOD PRESSURE: 80 MMHG | OXYGEN SATURATION: 98 % | TEMPERATURE: 98.3 F | SYSTOLIC BLOOD PRESSURE: 144 MMHG | HEART RATE: 78 BPM

## 2022-02-18 DIAGNOSIS — Z20.822 ENCOUNTER FOR SCREENING LABORATORY TESTING FOR COVID-19 VIRUS IN ASYMPTOMATIC PATIENT: Primary | ICD-10-CM

## 2022-02-18 LAB
INFLUENZA A: NOT DETECTED
INFLUENZA B: NOT DETECTED
SARS-COV-2 RNA, RT PCR: NOT DETECTED

## 2022-02-18 PROCEDURE — 99213 OFFICE O/P EST LOW 20 MIN: CPT

## 2022-02-18 PROCEDURE — 99212 OFFICE O/P EST SF 10 MIN: CPT | Performed by: EMERGENCY MEDICINE

## 2022-02-18 PROCEDURE — 87636 SARSCOV2 & INF A&B AMP PRB: CPT

## 2022-02-18 ASSESSMENT — ENCOUNTER SYMPTOMS
COUGH: 0
SHORTNESS OF BREATH: 0

## 2022-02-18 NOTE — Clinical Note
Glory Burgos was seen and treated in our emergency department on 2/18/2022. He may return to work on 02/19/2022. If covid test negative     If you have any questions or concerns, please don't hesitate to call.       Mordecai Bloch, APRN - CNP

## 2022-02-18 NOTE — ED PROVIDER NOTES
[facility]  Urgent Care Encounter       CHIEF COMPLAINT       Chief Complaint   Patient presents with    Covid Testing       Nurses Notes reviewed and I agree except as noted in the HPI. HISTORY OF PRESENT ILLNESS   Chey Castillo is a 55 y.o. male who presents for testing for COVID-19. Patient states his wife is positive for COVID-19. He has no symptoms. His work requires him to be tested for COVID-19. No cough, no shortness of breath. HPI    REVIEW OF SYSTEMS     Review of Systems   Constitutional: Negative for chills, fatigue and fever. Respiratory: Negative for cough and shortness of breath. Genitourinary: Negative for hematuria. Neurological: Negative for dizziness and headaches. Psychiatric/Behavioral: Negative for behavioral problems. PAST MEDICAL HISTORY         Diagnosis Date    Allergic rhinitis     B[facility] disorder ([facility]     Chronic back pain     COPD (chronic obstructive pulmonary disease) (HCC)     Depression     GERD (gastroesophageal reflux disease)     Hypertension     Liver disease     Seizures (HCC)     Substance abuse ([facility]     TBI (traumatic brain injury) ([facility]     Tic        SURGICALHISTORY     Patient  has a past surgical history that includes Facial cosmetic surgery (12/2005). CURRENT MEDICATIONS       Discharge Medication List as of 2/18/2022  1:04 PM      CONTINUE these medications which have NOT CHANGED    Details   losartan (COZAAR) 50 MG tablet Take 1 tablet by mouth 2 times daily, Disp-60 tablet, R-0Normal      NARCAN 4 MG/0.1ML LIQD nasal spray ADMINISTER A SINGLE SPRAY INTRANASALLY INTO ONE NOSTRIL. CALL 911. MAY REPEAT X 1., DAWHistorical Med             ALLERGIES     Patient is has No Known Allergies.     Patients   Immunization History   Administered Date(s) Administered    Tdap (Boostrix, Adacel) 01/17/2022       FAMILY HISTORY     Patient's family history includes Alcohol Abuse in his father and maternal grandfather; Allergy (Severe) in his mother; Anemia in his mother; Arrhythmia in his mother; Arthritis in his mother; Asthma in his mother; Breast Cancer in his paternal aunt; Cancer in his maternal aunt, maternal uncle, and paternal aunt; Coronary Art Dis in his mother; Depression in his mother; Diabetes in his mother; Early Death in his father; Heart Attack in his maternal aunt, maternal grandfather, maternal grandmother, maternal uncle, paternal aunt, paternal grandfather, paternal grandmother, and paternal uncle; Heart Disease in his maternal aunt, maternal grandfather, maternal grandmother, maternal uncle, paternal aunt, paternal grandfather, paternal grandmother, and paternal uncle; High Blood Pressure in his father, maternal aunt, maternal uncle, mother, paternal aunt, and paternal uncle; High Cholesterol in his father and mother; Learning Disabilities in his maternal aunt, maternal cousin, maternal uncle, paternal aunt, paternal cousin, and paternal uncle; Mental Illness in his maternal cousin; Mental Retardation in his maternal cousin; [place] Lute / [place] in his mother; Substance Abuse in his paternal grandfather. SOCIAL HISTORY     Patient  reports that he has been smoking cigarettes. He started smoking about 44 years ago. He has a 17.50 pack-year smoking history. He has never used smokeless tobacco. He reports previous alcohol use. He reports previous drug use. Frequency: 2.00 times per week. Drugs: Marijuana (Glennie Dome), Opiates , and Methamphetamines (Crystal Meth). PHYSICAL EXAM     ED TRIAGE VITALS  BP: (!) 144/80, Temp: 98.3 °F (36.8 °C), Pulse: 78, Resp: 16, SpO2: 98 %,Estimated body mass index is 26.63 kg/m² as calculated from the following:    Height as of 1/17/22: 5' 7\" (1.702 m). Weight as of 1/17/22: 170 lb (77.1 kg). ,No LMP for male patient. Physical Exam  Constitutional:       Appearance: He is normal weight.    HENT:      Nose: Nose normal.      Mouth/Throat:      Mouth: Mucous membranes are moist.      Pharynx: Oropharynx is clear. Cardiovascular:      Rate and Rhythm: Normal rate and regular rhythm. Pulses: Normal pulses. Heart sounds: Normal heart sounds. Pulmonary:      Effort: Pulmonary effort is normal.      Breath sounds: Normal breath sounds. Skin:     General: Skin is warm and dry. Capillary Refill: Capillary refill takes less than 2 seconds. Neurological:      General: No focal deficit present. Mental Status: He is alert. Psychiatric:         Mood and Affect: Mood normal.         Behavior: Behavior normal.         DIAGNOSTIC RESULTS     Labs:No results found for this visit on 02/18/22. IMAGING:    No orders to display         EKG:      URGENT CARE COURSE:     Vitals:    02/18/22 1258   BP: (!) 144/80   Pulse: 78   Resp: 16   Temp: 98.3 °F (36.8 °C)   TempSrc: Temporal   SpO2: 98%       Medications - No data to display         PROCEDURES:  None    FINAL IMPRESSION      1. Encounter for screening laboratory testing for COVID-19 virus in asymptomatic patient          DISPOSITION/ PLAN     Presents for COVID-19 screening test.  Asymptomatic. PCR test in process. Advised to access [employer] for results tomorrow. Return to work tomorrow if result negative. Return as needed      PATIENT REFERRED TO:  No primary care provider on file. No primary physician on file.       DISCHARGE MEDICATIONS:  Discharge Medication List as of 2/18/2022  1:04 PM          Discharge Medication List as of 2/18/2022  1:04 PM          Discharge Medication List as of 2/18/2022  1:04 PM          SERINA Joiner CNP    (Please note that portions of this note were completed with a voice recognition program. Efforts were made to edit the dictations but occasionally words are mis-transcribed.)          SERINA Joiner CNP  02/18/22 3715

## 2022-05-19 NOTE — PROGRESS NOTES
Verbal order per Dr. Eliot Jensen for urine drug screen. Negative for all drugs. Verified results with Juan Espinosa. Dr. Eliot Jensen ordered Suboxone 8mg film BID  for patient. Verified dose with patient. Patient was sent home with 1 week script of Suboxone 8mg film BID and will be seen back in the office 12/22/21. Time-based billing (NON-critical care) Time-based billing (NON-critical care) Time-based billing (NON-critical care)